# Patient Record
Sex: FEMALE | Race: WHITE | NOT HISPANIC OR LATINO | URBAN - METROPOLITAN AREA
[De-identification: names, ages, dates, MRNs, and addresses within clinical notes are randomized per-mention and may not be internally consistent; named-entity substitution may affect disease eponyms.]

---

## 2020-07-25 ENCOUNTER — TELEPHONE ENCOUNTER (OUTPATIENT)
Dept: URBAN - METROPOLITAN AREA CLINIC 13 | Facility: CLINIC | Age: 60
End: 2020-07-25

## 2020-07-25 RX ORDER — LISINOPRIL 2.5 MG/1
TAKE 1 TABLET DAILY TABLET ORAL
Refills: 0 | OUTPATIENT
End: 2017-05-02

## 2020-07-25 RX ORDER — OSPEMIFENE 60 MG/1
TAKE 1 TABLET DAILY TABLET, FILM COATED ORAL
Refills: 0 | OUTPATIENT
End: 2018-08-14

## 2020-07-25 RX ORDER — PANTOPRAZOLE SODIUM 20 MG
TAKE 1 TABLET DAILY TABLET, DELAYED RELEASE (ENTERIC COATED) ORAL
Refills: 0 | OUTPATIENT
Start: 2007-08-29 | End: 2008-01-23

## 2020-07-25 RX ORDER — OMEPRAZOLE 40 MG/1
TAKE (1) TABLET BY MOUTH DAILY CAPSULE, DELAYED RELEASE ORAL
Qty: 30 | Refills: 2 | OUTPATIENT
Start: 2016-01-19 | End: 2016-04-13

## 2020-07-25 RX ORDER — ALENDRONATE SODIUM 70 MG/1
TAKE 1 TABLET ONCE WEEKLY TABLET ORAL
Refills: 0 | OUTPATIENT
Start: 2012-07-26 | End: 2019-07-18

## 2020-07-25 RX ORDER — POLYETHYLENE GLYCOL 3350, SODIUM CHLORIDE, SODIUM BICARBONATE AND POTASSIUM CHLORIDE WITH LEMON FLAVOR 420; 11.2; 5.72; 1.48 G/4L; G/4L; G/4L; G/4L
TAKE AS DIRECTED POWDER, FOR SOLUTION ORAL
Qty: 1 | Refills: 0 | OUTPATIENT
Start: 2015-06-03 | End: 2016-04-13

## 2020-07-25 RX ORDER — MORPHINE SULFATE 15 MG
TAKE 1 TABLET EVERY 4 TO 6 HOURS AS NEEDED FOR PAIN TABLET ORAL
Refills: 0 | OUTPATIENT
End: 2017-05-02

## 2020-07-25 RX ORDER — METOCLOPRAMIDE 10 MG/1
TAKE 1 TABLET TWICE DAILY PRN NAUSEA/PAIN TABLET ORAL
Qty: 60 | Refills: 2 | OUTPATIENT
Start: 2016-07-21 | End: 2017-05-02

## 2020-07-25 RX ORDER — POLYETHYLENE GLYCOL 3350, SODIUM CHLORIDE, SODIUM BICARBONATE AND POTASSIUM CHLORIDE WITH LEMON FLAVOR 420; 11.2; 5.72; 1.48 G/4L; G/4L; G/4L; G/4L
TAKE 1/2 GALLON AT 5:00 PM DAY BEFORE PROCEDURE, TAKE SECOND 1/2 OF GALLON 6 HRS PRIOR TO PROCEDURE POWDER, FOR SOLUTION ORAL
Qty: 1 | Refills: 0 | OUTPATIENT
Start: 2019-09-26 | End: 2020-02-26

## 2020-07-25 RX ORDER — DIAZEPAM 5 MG/1
TAKE 1 TABLET EVERY 6 HOURS TAKE 1-2 TABLETS BY MOUTH PRE-MRI TABLET ORAL
Qty: 5 | Refills: 0 | OUTPATIENT
Start: 2018-07-16 | End: 2019-09-26

## 2020-07-25 RX ORDER — POLYETHYLENE GLYCOL 3350 17 G/DOSE
TAKE AS DIRECTED ON PATIENT INSTRUCTION CARD POWDER (GRAM) ORAL
Refills: 0 | OUTPATIENT
Start: 2007-07-23 | End: 2016-04-13

## 2020-07-25 RX ORDER — HYDROCODONE BITARTRATE AND ACETAMINOPHEN 7.5; 325 MG/1; MG/1
TAKE 1 TABLET EVERY 4 TO 6 HOURS AS NEEDED TABLET ORAL
Refills: 0 | OUTPATIENT
Start: 2007-07-23 | End: 2009-10-23

## 2020-07-25 RX ORDER — LINACLOTIDE 145 UG/1
TAKE ONE CAPSULE BY MOUTH EVERY MORNING BEFORE THE FIRST MEAL OF THE DAY CAPSULE, GELATIN COATED ORAL
Qty: 30 | Refills: 8 | OUTPATIENT
Start: 2015-06-02 | End: 2016-04-13

## 2020-07-25 RX ORDER — LINACLOTIDE 290 UG/1
TAKE ONE CAPSULE BY MOUTH DAILY 30 MINUTES BEFORE BREAKFAST CAPSULE, GELATIN COATED ORAL
Qty: 30 | Refills: 5 | OUTPATIENT
Start: 2016-04-13 | End: 2017-05-02

## 2020-07-25 RX ORDER — TRAZODONE HYDROCHLORIDE 50 MG/1
TABLET ORAL
Qty: 90 | Refills: 0 | OUTPATIENT
Start: 2019-05-07 | End: 2019-07-18

## 2020-07-25 RX ORDER — PANTOPRAZOLE SODIUM 40 MG/1
TAKE ONE TABLET BY MOUTH ONCE DAILY TABLET, DELAYED RELEASE ORAL
Qty: 30 | Refills: 10 | OUTPATIENT
Start: 2016-02-16 | End: 2017-05-02

## 2020-07-25 RX ORDER — AMITRIPTYLINE HYDROCHLORIDE 50 MG/1
TABLET, FILM COATED ORAL
Qty: 30 | Refills: 0 | OUTPATIENT
Start: 2019-06-27 | End: 2019-07-18

## 2020-07-25 RX ORDER — OXYCODONE AND ACETAMINOPHEN 5; 325 MG/1; MG/1
TABLET ORAL
Qty: 30 | Refills: 0 | OUTPATIENT
Start: 2011-07-08 | End: 2014-12-16

## 2020-07-25 RX ORDER — DIAZEPAM 5 MG/1
TAKE 1 TO 2 TABLETS BEFORE PROCEDURE TABLET ORAL
Qty: 2 | Refills: 0 | OUTPATIENT
Start: 2016-07-21 | End: 2017-05-02

## 2020-07-25 RX ORDER — AMITRIPTYLINE HYDROCHLORIDE 25 MG/1
TABLET, FILM COATED ORAL
Qty: 30 | Refills: 0 | OUTPATIENT
Start: 2019-05-30 | End: 2019-07-18

## 2020-07-25 RX ORDER — POLYETHYLENE GLYCOL 3350, SODIUM CHLORIDE, SODIUM BICARBONATE AND POTASSIUM CHLORIDE WITH LEMON FLAVOR 420; 11.2; 5.72; 1.48 G/4L; G/4L; G/4L; G/4L
USE AS DIRECTED POWDER, FOR SOLUTION ORAL
Qty: 1 | Refills: 0 | OUTPATIENT
Start: 2016-04-13 | End: 2016-07-21

## 2020-07-25 RX ORDER — METOCLOPRAMIDE 10 MG/1
TAKE ONE TABLET BY MOUTH TWO TIMES A DAY AS NEEDED FOR NAUSEA/PAIN TABLET ORAL
Qty: 60 | Refills: 5 | OUTPATIENT
Start: 2016-11-15 | End: 2019-09-26

## 2020-07-26 ENCOUNTER — TELEPHONE ENCOUNTER (OUTPATIENT)
Dept: URBAN - METROPOLITAN AREA CLINIC 13 | Facility: CLINIC | Age: 60
End: 2020-07-26

## 2020-07-26 RX ORDER — CEPHALEXIN 500 MG/1
CAPSULE ORAL
Qty: 7 | Refills: 0 | Status: ACTIVE | COMMUNITY
Start: 2011-10-28

## 2020-07-26 RX ORDER — MORPHINE SULFATE 30 MG/1
TAKE 1 TABLET TWICE DAILY TABLET, FILM COATED, EXTENDED RELEASE ORAL
Refills: 0 | Status: ACTIVE | COMMUNITY
Start: 2019-03-19

## 2020-07-26 RX ORDER — CYCLOBENZAPRINE HYDROCHLORIDE 10 MG/1
TABLET, FILM COATED ORAL
Qty: 90 | Refills: 0 | Status: ACTIVE | COMMUNITY
Start: 2019-09-18

## 2020-07-26 RX ORDER — NITROFURANTOIN MONOHYDRATE/MACROCRYSTALLINE 25; 75 MG/1; MG/1
CAPSULE ORAL
Qty: 14 | Refills: 0 | Status: ACTIVE | COMMUNITY
Start: 2011-08-01

## 2020-07-26 RX ORDER — FLUTICASONE PROPIONATE 50 UG/1
SPRAY, METERED NASAL
Qty: 16 | Refills: 0 | Status: ACTIVE | COMMUNITY
Start: 2013-04-16

## 2020-07-26 RX ORDER — PHENOBARBITAL, HYOSCYAMINE SULFATE, ATROPINE SULFATE, SCOPOLAMINE HYDROBROMIDE 16.2; .1037; .0194; .0065 MG/1; MG/1; MG/1; MG/1
TAKE 1 TABLET BY MOUTH EVERY 6 HOURS FOR PAIN TABLET ORAL
Qty: 60 | Refills: 3 | Status: ACTIVE | COMMUNITY
Start: 2020-07-09

## 2020-07-26 RX ORDER — HYDROCODONE BITARTRATE AND ACETAMINOPHEN 5; 325 MG/1; MG/1
TABLET ORAL
Qty: 26 | Refills: 0 | Status: ACTIVE | COMMUNITY
Start: 2018-10-18

## 2020-07-26 RX ORDER — HYDROXYZINE PAMOATE 25 MG/1
CAPSULE ORAL
Qty: 30 | Refills: 0 | Status: ACTIVE | COMMUNITY
Start: 2020-01-02

## 2020-07-26 RX ORDER — PREGABALIN 100 MG
CAPSULE ORAL
Qty: 90 | Refills: 0 | Status: ACTIVE | COMMUNITY
Start: 2011-11-17

## 2020-07-26 RX ORDER — OXYCODONE 15 MG/1
TAKE 1 TABLET EVERY 6 HOURS AS NEEDED FOR PAIN TABLET ORAL
Refills: 0 | Status: ACTIVE | COMMUNITY

## 2020-07-26 RX ORDER — CYCLOBENZAPRINE HYDROCHLORIDE 10 MG/1
TABLET, FILM COATED ORAL
Qty: 90 | Refills: 0 | Status: ACTIVE | COMMUNITY
Start: 2018-08-21

## 2020-07-26 RX ORDER — CYCLOBENZAPRINE HYDROCHLORIDE 10 MG/1
TABLET, FILM COATED ORAL
Qty: 90 | Refills: 0 | Status: ACTIVE | COMMUNITY
Start: 2019-06-20

## 2020-07-26 RX ORDER — SODIUM SULFATE, POTASSIUM SULFATE, MAGNESIUM SULFATE 17.5; 3.13; 1.6 G/ML; G/ML; G/ML
TAKE 1 BOTTLE AT 5:00PM THE DAY BEFORE PROCEDURE. TAKE 2ND BOTTLE 6 HRS PRIOR TO PROCEDURE SOLUTION, CONCENTRATE ORAL
Qty: 1 | Refills: 0 | Status: ACTIVE | COMMUNITY
Start: 2020-03-12

## 2020-07-26 RX ORDER — PRAVASTATIN SODIUM 20 MG/1
TAKE 1 TABLET DAILY AS DIRECTED TABLET ORAL
Refills: 0 | Status: ACTIVE | COMMUNITY
Start: 2019-07-02

## 2020-07-26 RX ORDER — ONDANSETRON HYDROCHLORIDE 4 MG/1
TAKE 1 TABLET BY MOUTH EVERY 4 TO 6 HOURS AS NEEDED FOR NAUSEA TABLET, FILM COATED ORAL
Qty: 30 | Refills: 1 | Status: ACTIVE | COMMUNITY
Start: 2019-09-26

## 2020-07-26 RX ORDER — PHENAZOPYRIDINE HYDROCHLORIDE 100 MG/1
TAKE 1 CAPSULE TWICE DAILY TABLET, COATED ORAL
Refills: 0 | Status: ACTIVE | COMMUNITY

## 2020-07-26 RX ORDER — NITROFURANTOIN MONOHYDRATE/MACROCRYSTALLINE 25; 75 MG/1; MG/1
CAPSULE ORAL
Qty: 10 | Refills: 0 | Status: ACTIVE | COMMUNITY
Start: 2019-05-31

## 2020-07-26 RX ORDER — AMITRIPTYLINE HYDROCHLORIDE 10 MG/1
TAKE 1 TABLET BY MOUTH AT BEDTIME TABLET, FILM COATED ORAL
Qty: 30 | Refills: 5 | Status: ACTIVE | COMMUNITY
Start: 2019-09-26

## 2020-07-26 RX ORDER — PROMETHAZINE HYDROCHLORIDE 12.5 MG/1
TABLET ORAL
Qty: 30 | Refills: 0 | Status: ACTIVE | COMMUNITY
Start: 2018-10-08

## 2020-07-26 RX ORDER — MUPIROCIN 20 MG/G
OINTMENT TOPICAL
Qty: 22 | Refills: 0 | Status: ACTIVE | COMMUNITY
Start: 2020-01-14

## 2020-07-26 RX ORDER — PREGABALIN 100 MG
TAKE 1 CAPSULE 3 TIMES DAILY CAPSULE ORAL
Refills: 0 | Status: ACTIVE | COMMUNITY
Start: 2018-06-21

## 2020-07-26 RX ORDER — PREGABALIN 25 MG/1
CAPSULE ORAL
Qty: 90 | Refills: 0 | Status: ACTIVE | COMMUNITY
Start: 2011-10-24

## 2020-07-26 RX ORDER — CYCLOBENZAPRINE HYDROCHLORIDE 10 MG/1
TABLET, FILM COATED ORAL
Qty: 90 | Refills: 0 | Status: ACTIVE | COMMUNITY
Start: 2020-03-10

## 2020-07-26 RX ORDER — PHENOBARBITAL, HYOSCYAMINE SULFATE, ATROPINE SULFATE, SCOPOLAMINE HYDROBROMIDE 16.2; .1037; .0194; .0065 MG/1; MG/1; MG/1; MG/1
TAKE 1 TABLET EVERY 6 HOURS PRN TAKE 1 TABLET EVERY 6 HOURS AS NEEDED FOR PAIN TABLET ORAL
Qty: 60 | Refills: 3 | Status: ACTIVE | COMMUNITY
Start: 2019-07-02

## 2020-07-26 RX ORDER — VALACYCLOVIR HYDROCHLORIDE 500 MG/1
TABLET, FILM COATED ORAL
Qty: 14 | Refills: 0 | Status: ACTIVE | COMMUNITY
Start: 2020-03-05

## 2020-07-26 RX ORDER — METHENAMINE, SODIUM PHOSPHATE, MONOBASIC, METHYLENE BLUE, AND HYOSCYAMINE SULFATE 81.6; 40.8; 10.8; .12 MG/1; MG/1; MG/1; MG/1
TABLET, COATED ORAL
Qty: 30 | Refills: 0 | Status: ACTIVE | COMMUNITY
Start: 2012-04-11

## 2020-07-26 RX ORDER — CIPROFLOXACIN 3 MG/ML
SOLUTION OPHTHALMIC
Qty: 5 | Refills: 0 | Status: ACTIVE | COMMUNITY
Start: 2018-08-07

## 2020-07-26 RX ORDER — CEPHALEXIN 500 MG/1
CAPSULE ORAL
Qty: 7 | Refills: 0 | Status: ACTIVE | COMMUNITY
Start: 2020-01-14

## 2020-07-26 RX ORDER — CYCLOBENZAPRINE HYDROCHLORIDE 10 MG/1
TABLET, FILM COATED ORAL
Qty: 90 | Refills: 0 | Status: ACTIVE | COMMUNITY
Start: 2018-07-19

## 2020-07-26 RX ORDER — OXYCODONE AND ACETAMINOPHEN 5; 325 MG/1; MG/1
TABLET ORAL
Qty: 30 | Refills: 0 | Status: ACTIVE | COMMUNITY
Start: 2011-07-08

## 2020-07-26 RX ORDER — ONDANSETRON 4 MG/1
TABLET, ORALLY DISINTEGRATING ORAL
Qty: 15 | Refills: 0 | Status: ACTIVE | COMMUNITY
Start: 2019-05-31

## 2020-07-26 RX ORDER — MUPIROCIN 20 MG/G
OINTMENT TOPICAL
Qty: 22 | Refills: 0 | Status: ACTIVE | COMMUNITY
Start: 2019-06-27

## 2020-07-26 RX ORDER — HYDROCODONE BITARTRATE AND HOMATROPINE METHYLBROMIDE 5; 1.5 MG/5ML; MG/5ML
SYRUP ORAL
Qty: 120 | Refills: 0 | Status: ACTIVE | COMMUNITY
Start: 2013-04-16

## 2020-07-26 RX ORDER — HYDROMORPHONE HYDROCHLORIDE 2 MG/1
TABLET ORAL
Qty: 40 | Refills: 0 | Status: ACTIVE | COMMUNITY
Start: 2018-10-08

## 2020-07-26 RX ORDER — CYCLOBENZAPRINE HYDROCHLORIDE 10 MG/1
TABLET, FILM COATED ORAL
Qty: 90 | Refills: 0 | Status: ACTIVE | COMMUNITY
Start: 2019-02-15

## 2020-07-26 RX ORDER — PREGABALIN 100 MG
CAPSULE ORAL
Qty: 90 | Refills: 0 | Status: ACTIVE | COMMUNITY
Start: 2013-02-01

## 2020-07-26 RX ORDER — CLONIDINE HYDROCHLORIDE 0.1 MG/1
TAKE 1 TABLET TWICE DAILY TABLET ORAL
Refills: 0 | Status: ACTIVE | COMMUNITY
Start: 2019-07-03

## 2020-07-26 RX ORDER — CYCLOBENZAPRINE HYDROCHLORIDE 10 MG/1
TABLET, FILM COATED ORAL
Qty: 90 | Refills: 0 | Status: ACTIVE | COMMUNITY
Start: 2018-10-22

## 2020-07-26 RX ORDER — PANTOPRAZOLE SODIUM 40 MG/1
TAKE ONE TABLET BY MOUTH TWO TIMES A DAY TABLET, DELAYED RELEASE ORAL
Qty: 180 | Refills: 3 | Status: ACTIVE | COMMUNITY
Start: 2016-08-01

## 2020-07-26 RX ORDER — AMOXICILLIN AND CLAVULANATE POTASSIUM 875; 125 MG/1; MG/1
TABLET, FILM COATED ORAL
Qty: 20 | Refills: 0 | Status: ACTIVE | COMMUNITY
Start: 2013-04-16

## 2020-11-02 ENCOUNTER — ERX REFILL RESPONSE (OUTPATIENT)
Age: 60
End: 2020-11-02

## 2020-11-02 RX ORDER — PANTOPRAZOLE SODIUM 40 MG/1
TAKE ONE TABLET BY MOUTH TWO TIMES A DAY TABLET, DELAYED RELEASE ORAL
Qty: 180 | Refills: 2

## 2020-12-22 ENCOUNTER — ERX REFILL RESPONSE (OUTPATIENT)
Age: 60
End: 2020-12-22

## 2020-12-22 RX ORDER — ONDANSETRON HYDROCHLORIDE 4 MG/1
TAKE 1 TABLET BY MOUTH EVERY 4 TO 6 HOURS AS NEEDED FOR NAUSEA TABLET, FILM COATED ORAL
Qty: 30 | Refills: 0

## 2021-01-20 ENCOUNTER — TELEPHONE ENCOUNTER (OUTPATIENT)
Dept: URBAN - METROPOLITAN AREA CLINIC 113 | Facility: CLINIC | Age: 61
End: 2021-01-20

## 2021-01-20 RX ORDER — PHENOBARBITAL, HYOSCYAMINE SULFATE, ATROPINE SULFATE, SCOPOLAMINE HYDROBROMIDE 16.2; .1037; .0194; .0065 MG/1; MG/1; MG/1; MG/1
1 TABLET TABLET ORAL THREE TIMES A DAY
Qty: 90 TABLET | Refills: 3 | OUTPATIENT
Start: 2021-01-20 | End: 2021-05-20

## 2021-02-22 ENCOUNTER — TELEPHONE ENCOUNTER (OUTPATIENT)
Dept: URBAN - METROPOLITAN AREA CLINIC 113 | Facility: CLINIC | Age: 61
End: 2021-02-22

## 2021-02-22 ENCOUNTER — LAB OUTSIDE AN ENCOUNTER (OUTPATIENT)
Dept: URBAN - METROPOLITAN AREA CLINIC 113 | Facility: CLINIC | Age: 61
End: 2021-02-22

## 2021-02-22 ENCOUNTER — OFFICE VISIT (OUTPATIENT)
Dept: URBAN - METROPOLITAN AREA CLINIC 113 | Facility: CLINIC | Age: 61
End: 2021-02-22
Payer: COMMERCIAL

## 2021-02-22 VITALS
HEART RATE: 100 BPM | TEMPERATURE: 97.9 F | DIASTOLIC BLOOD PRESSURE: 83 MMHG | WEIGHT: 153 LBS | RESPIRATION RATE: 19 BRPM | HEIGHT: 63 IN | BODY MASS INDEX: 27.11 KG/M2 | SYSTOLIC BLOOD PRESSURE: 126 MMHG

## 2021-02-22 DIAGNOSIS — K21.9 GASTROESOPHAGEAL REFLUX DISEASE WITHOUT ESOPHAGITIS: ICD-10-CM

## 2021-02-22 DIAGNOSIS — K59.03 DRUG-INDUCED CONSTIPATION: ICD-10-CM

## 2021-02-22 DIAGNOSIS — D49.0 IPMN (INTRADUCTAL PAPILLARY MUCINOUS NEOPLASM): ICD-10-CM

## 2021-02-22 DIAGNOSIS — R11.0 NAUSEA: ICD-10-CM

## 2021-02-22 DIAGNOSIS — K31.84 GASTROPARESIS: ICD-10-CM

## 2021-02-22 DIAGNOSIS — Z12.11 COLON CANCER SCREENING: ICD-10-CM

## 2021-02-22 PROCEDURE — 99214 OFFICE O/P EST MOD 30 MIN: CPT | Performed by: INTERNAL MEDICINE

## 2021-02-22 RX ORDER — AMOXICILLIN AND CLAVULANATE POTASSIUM 875; 125 MG/1; MG/1
TABLET, FILM COATED ORAL
Qty: 20 | Refills: 0 | Status: DISCONTINUED | COMMUNITY
Start: 2013-04-16

## 2021-02-22 RX ORDER — VALACYCLOVIR HYDROCHLORIDE 500 MG/1
1 TABLET TABLET, FILM COATED ORAL ONCE A DAY
Refills: 0 | Status: ACTIVE | COMMUNITY
Start: 2020-03-05

## 2021-02-22 RX ORDER — ONDANSETRON 4 MG/1
1 TABLET ON THE TONGUE AND ALLOW TO DISSOLVE  AS NEEDED TABLET, ORALLY DISINTEGRATING ORAL AS NEEDED
Refills: 0 | Status: DISCONTINUED | COMMUNITY
Start: 2019-05-31

## 2021-02-22 RX ORDER — FENOFIBRATE 145 MG/1
1 TABLET TABLET, FILM COATED ORAL ONCE A DAY
Status: ACTIVE | COMMUNITY

## 2021-02-22 RX ORDER — PHENAZOPYRIDINE HYDROCHLORIDE 100 MG/1
TAKE 1 CAPSULE TWICE DAILY TABLET, COATED ORAL
Refills: 0 | Status: ACTIVE | COMMUNITY

## 2021-02-22 RX ORDER — CLONIDINE HYDROCHLORIDE 0.1 MG/1
TAKE 1 TABLET TWICE DAILY TABLET ORAL
Refills: 0 | Status: ACTIVE | COMMUNITY
Start: 2019-07-03

## 2021-02-22 RX ORDER — METHENAMINE, SODIUM PHOSPHATE, MONOBASIC, METHYLENE BLUE, AND HYOSCYAMINE SULFATE 81.6; 40.8; 10.8; .12 MG/1; MG/1; MG/1; MG/1
TABLET, COATED ORAL
Qty: 30 | Refills: 0 | Status: DISCONTINUED | COMMUNITY
Start: 2012-04-11

## 2021-02-22 RX ORDER — PHENOBARBITAL, HYOSCYAMINE SULFATE, ATROPINE SULFATE, SCOPOLAMINE HYDROBROMIDE 16.2; .1037; .0194; .0065 MG/1; MG/1; MG/1; MG/1
TAKE 1 TABLET BY MOUTH EVERY 6 HOURS FOR PAIN TABLET ORAL
Qty: 60 | Refills: 3 | Status: DISCONTINUED | COMMUNITY
Start: 2020-07-09

## 2021-02-22 RX ORDER — PHENOBARBITAL, HYOSCYAMINE SULFATE, ATROPINE SULFATE, SCOPOLAMINE HYDROBROMIDE 16.2; .1037; .0194; .0065 MG/1; MG/1; MG/1; MG/1
TAKE 1 TABLET EVERY 6 HOURS PRN TAKE 1 TABLET EVERY 6 HOURS AS NEEDED FOR PAIN TABLET ORAL
Qty: 60 | Refills: 3 | Status: DISCONTINUED | COMMUNITY
Start: 2019-07-02

## 2021-02-22 RX ORDER — HYDROMORPHONE HYDROCHLORIDE 2 MG/1
TABLET ORAL
Qty: 40 | Refills: 0 | Status: DISCONTINUED | COMMUNITY
Start: 2018-10-08

## 2021-02-22 RX ORDER — PANTOPRAZOLE SODIUM 40 MG/1
TAKE ONE TABLET BY MOUTH TWO TIMES A DAY TABLET, DELAYED RELEASE ORAL
Qty: 180 | Refills: 2 | Status: ACTIVE | COMMUNITY

## 2021-02-22 RX ORDER — MUPIROCIN 20 MG/G
OINTMENT TOPICAL
Qty: 22 | Refills: 0 | Status: DISCONTINUED | COMMUNITY
Start: 2019-06-27

## 2021-02-22 RX ORDER — FLUTICASONE PROPIONATE 50 UG/1
SPRAY, METERED NASAL
Qty: 16 | Refills: 0 | Status: DISCONTINUED | COMMUNITY
Start: 2013-04-16

## 2021-02-22 RX ORDER — PHENOBARBITAL, HYOSCYAMINE SULFATE, ATROPINE SULFATE, SCOPOLAMINE HYDROBROMIDE 16.2; .1037; .0194; .0065 MG/1; MG/1; MG/1; MG/1
1 TABLET TABLET ORAL THREE TIMES A DAY
Qty: 90 TABLET | Refills: 3 | Status: ACTIVE | COMMUNITY
Start: 2021-01-20 | End: 2021-05-20

## 2021-02-22 RX ORDER — HYDROCODONE BITARTRATE AND HOMATROPINE METHYLBROMIDE 5; 1.5 MG/5ML; MG/5ML
SYRUP ORAL
Qty: 120 | Refills: 0 | Status: DISCONTINUED | COMMUNITY
Start: 2013-04-16

## 2021-02-22 RX ORDER — PRAVASTATIN SODIUM 20 MG/1
TAKE 1 TABLET DAILY AS DIRECTED TABLET ORAL
Refills: 0 | Status: ACTIVE | COMMUNITY
Start: 2019-07-02

## 2021-02-22 RX ORDER — OXYCODONE AND ACETAMINOPHEN 5; 325 MG/1; MG/1
TABLET ORAL
Qty: 30 | Refills: 0 | Status: DISCONTINUED | COMMUNITY
Start: 2011-07-08

## 2021-02-22 RX ORDER — PROMETHAZINE HYDROCHLORIDE 12.5 MG/1
TABLET ORAL
Qty: 30 | Refills: 0 | Status: DISCONTINUED | COMMUNITY
Start: 2018-10-08

## 2021-02-22 RX ORDER — ONDANSETRON HYDROCHLORIDE 4 MG/1
TAKE 1 TABLET BY MOUTH EVERY 4 TO 6 HOURS AS NEEDED FOR NAUSEA TABLET, FILM COATED ORAL
Qty: 30 | Refills: 0 | Status: ACTIVE | COMMUNITY

## 2021-02-22 RX ORDER — HYDROCODONE BITARTRATE AND ACETAMINOPHEN 5; 325 MG/1; MG/1
TABLET ORAL
Qty: 26 | Refills: 0 | Status: DISCONTINUED | COMMUNITY
Start: 2018-10-18

## 2021-02-22 RX ORDER — AMITRIPTYLINE HYDROCHLORIDE 10 MG/1
TAKE 1 TABLET BY MOUTH AT BEDTIME TABLET, FILM COATED ORAL
Qty: 30 | Refills: 5 | Status: DISCONTINUED | COMMUNITY
Start: 2019-09-26

## 2021-02-22 RX ORDER — LANSOPRAZOLE 30 MG/1
1 TABLET TABLET, ORALLY DISINTEGRATING, DELAYED RELEASE ORAL ONCE A DAY
Qty: 30 | Refills: 3 | Status: ACTIVE | COMMUNITY

## 2021-02-22 RX ORDER — MORPHINE SULFATE 30 MG/1
TAKE 1 TABLET TWICE DAILY TABLET, FILM COATED, EXTENDED RELEASE ORAL
Refills: 0 | Status: ACTIVE | COMMUNITY
Start: 2019-03-19

## 2021-02-22 RX ORDER — LANSOPRAZOLE 30 MG/1
1 TABLET TABLET, ORALLY DISINTEGRATING, DELAYED RELEASE ORAL ONCE A DAY
Qty: 30 | Refills: 3 | OUTPATIENT

## 2021-02-22 RX ORDER — OXYCODONE 15 MG/1
TAKE 1 TABLET EVERY 6 HOURS AS NEEDED FOR PAIN TABLET ORAL
Refills: 0 | Status: ACTIVE | COMMUNITY

## 2021-02-22 RX ORDER — SODIUM SULFATE, POTASSIUM SULFATE, MAGNESIUM SULFATE 17.5; 3.13; 1.6 G/ML; G/ML; G/ML
TAKE 1 BOTTLE AT 5:00PM THE DAY BEFORE PROCEDURE. TAKE 2ND BOTTLE 6 HRS PRIOR TO PROCEDURE SOLUTION, CONCENTRATE ORAL
Qty: 1 | Refills: 0 | Status: DISCONTINUED | COMMUNITY
Start: 2020-03-12

## 2021-02-22 RX ORDER — HYDROXYZINE PAMOATE 25 MG/1
CAPSULE ORAL
Qty: 30 | Refills: 0 | Status: DISCONTINUED | COMMUNITY
Start: 2020-01-02

## 2021-02-22 RX ORDER — CYCLOBENZAPRINE HYDROCHLORIDE 10 MG/1
TABLET, FILM COATED ORAL
Qty: 90 | Refills: 0 | Status: DISCONTINUED | COMMUNITY
Start: 2018-07-19

## 2021-02-22 RX ORDER — PREGABALIN 25 MG/1
CAPSULE ORAL
Qty: 90 | Refills: 0 | Status: DISCONTINUED | COMMUNITY
Start: 2011-10-24

## 2021-02-22 RX ORDER — CIPROFLOXACIN 3 MG/ML
SOLUTION OPHTHALMIC
Qty: 5 | Refills: 0 | Status: DISCONTINUED | COMMUNITY
Start: 2018-08-07

## 2021-02-22 RX ORDER — NITROFURANTOIN MONOHYDRATE/MACROCRYSTALLINE 25; 75 MG/1; MG/1
CAPSULE ORAL
Qty: 14 | Refills: 0 | Status: DISCONTINUED | COMMUNITY
Start: 2011-08-01

## 2021-02-22 RX ORDER — SODIUM PICOSULFATE, MAGNESIUM OXIDE, AND ANHYDROUS CITRIC ACID 10; 3.5; 12 MG/160ML; G/160ML; G/160ML
160ML LIQUID ORAL ONCE
Qty: 1 | Refills: 0 | OUTPATIENT
Start: 2021-02-22 | End: 2021-02-23

## 2021-02-22 RX ORDER — CEPHALEXIN 500 MG/1
CAPSULE ORAL
Qty: 7 | Refills: 0 | Status: DISCONTINUED | COMMUNITY
Start: 2011-10-28

## 2021-02-22 RX ORDER — PREGABALIN 100 MG
CAPSULE ORAL
Qty: 90 | Refills: 0 | Status: DISCONTINUED | COMMUNITY
Start: 2011-11-17

## 2021-02-22 NOTE — HPI-TODAY'S VISIT:
The patient is a very delightful 60-year-old female who I followed for common bile duct stones, IPMN and narcotic bowel syndrome.  She was last seen in September 2019.  Her narcotic bowel syndrome was complicated by constipation gastroesophageal reflux disease and gastroparesis.  Her last upper endoscopy was in 2006.  This revealed gastric stasis.  Her last ERCP was in 2016.  There were no stones or sludge seen despite MRI showing stones and sludge.  There were no gastric abnormalities seen.  Last colonoscopy was in February 2020.  This was for screening purposes.  Quality of the prep was poor.  Patient had diverticulosis present.  She was scheduled for repeat colonoscopy with a 2-day prep.  Last imaging studies are CT abdomen and pelvis with contrast in August 2019 which showed hepatic steatosis and an ovarian cyst.  Last MRI was July 2018.  This revealed a 1.3 cm IPMN within the head of the pancreas.she's  She states in the last year she had surgery on her low back but this did not help.  She had a very bad experience with the surgery.  She was unable to come back for repeat colonoscopy due to the surgery and covid issues.she's having more nausea and heartburn.  She is on pantoprazole which is known poor treatment in patients with gastroparesis.  She states her constipation is under good control.

## 2021-02-25 ENCOUNTER — ERX REFILL RESPONSE (OUTPATIENT)
Dept: URBAN - METROPOLITAN AREA CLINIC 113 | Facility: CLINIC | Age: 61
End: 2021-02-25

## 2021-02-25 ENCOUNTER — TELEPHONE ENCOUNTER (OUTPATIENT)
Dept: URBAN - METROPOLITAN AREA CLINIC 113 | Facility: CLINIC | Age: 61
End: 2021-02-25

## 2021-02-25 RX ORDER — POLYETHYLENE GLYCOL 3350, SODIUM CHLORIDE, SODIUM BICARBONATE AND POTASSIUM CHLORIDE 420G
AS DIRECTED KIT ORAL ONCE
Qty: 420 GRAM | Refills: 0 | OUTPATIENT
Start: 2021-02-26 | End: 2021-02-27

## 2021-02-25 RX ORDER — POLYETHYLENE GLYCOL 3350, SODIUM SULFATE, SODIUM CHLORIDE, POTASSIUM CHLORIDE, ASCORBIC ACID, SODIUM ASCORBATE 140-9-5.2G
AS DIRECTED KIT ORAL
OUTPATIENT
Start: 2021-02-26

## 2021-02-25 RX ORDER — ONDANSETRON HYDROCHLORIDE 4 MG/1
TAKE 1 TABLET BY MOUTH EVERY 4 TO 6 HOURS AS NEEDED FOR NAUSEA TABLET, FILM COATED ORAL
Qty: 30 | Refills: 0

## 2021-02-25 RX ORDER — POLYETHYLENE GLYCOL 3350, SODIUM SULFATE, SODIUM CHLORIDE, POTASSIUM CHLORIDE, ASCORBIC ACID, SODIUM ASCORBATE 140-9-5.2G
AS DIRECTED KIT ORAL ONCE
Qty: 140 GRAMS | Refills: 0 | OUTPATIENT
Start: 2021-02-26 | End: 2021-02-27

## 2021-02-26 ENCOUNTER — TELEPHONE ENCOUNTER (OUTPATIENT)
Dept: URBAN - METROPOLITAN AREA CLINIC 113 | Facility: CLINIC | Age: 61
End: 2021-02-26

## 2021-02-26 RX ORDER — ONDANSETRON HYDROCHLORIDE 4 MG/1
1 TABLET TABLET, FILM COATED ORAL
Qty: 30 TABLETS | Refills: 0 | OUTPATIENT

## 2021-03-09 ENCOUNTER — TELEPHONE ENCOUNTER (OUTPATIENT)
Dept: URBAN - METROPOLITAN AREA SURGERY CENTER 30 | Facility: SURGERY CENTER | Age: 61
End: 2021-03-09

## 2021-03-24 ENCOUNTER — TELEPHONE ENCOUNTER (OUTPATIENT)
Dept: URBAN - METROPOLITAN AREA CLINIC 113 | Facility: CLINIC | Age: 61
End: 2021-03-24

## 2021-03-29 ENCOUNTER — OFFICE VISIT (OUTPATIENT)
Dept: URBAN - METROPOLITAN AREA SURGERY CENTER 25 | Facility: SURGERY CENTER | Age: 61
End: 2021-03-29
Payer: COMMERCIAL

## 2021-03-29 ENCOUNTER — CLAIMS CREATED FROM THE CLAIM WINDOW (OUTPATIENT)
Dept: URBAN - METROPOLITAN AREA CLINIC 4 | Facility: CLINIC | Age: 61
End: 2021-03-29
Payer: COMMERCIAL

## 2021-03-29 DIAGNOSIS — Z12.11 COLON CANCER SCREENING: ICD-10-CM

## 2021-03-29 DIAGNOSIS — D12.0 BENIGN NEOPLASM OF CECUM: ICD-10-CM

## 2021-03-29 DIAGNOSIS — C7A.010 MALIGNANT CARCINOID TUMOR OF THE DUODENUM: ICD-10-CM

## 2021-03-29 DIAGNOSIS — D3A.012 BENIGN CARCINOID TUMOR OF ILEUM: ICD-10-CM

## 2021-03-29 DIAGNOSIS — D12.0 ADENOMA OF CECUM: ICD-10-CM

## 2021-03-29 PROCEDURE — 88305 TISSUE EXAM BY PATHOLOGIST: CPT | Performed by: PATHOLOGY

## 2021-03-29 PROCEDURE — G8907 PT DOC NO EVENTS ON DISCHARG: HCPCS | Performed by: INTERNAL MEDICINE

## 2021-03-29 PROCEDURE — 88341 IMHCHEM/IMCYTCHM EA ADD ANTB: CPT | Performed by: PATHOLOGY

## 2021-03-29 PROCEDURE — 88342 IMHCHEM/IMCYTCHM 1ST ANTB: CPT | Performed by: PATHOLOGY

## 2021-03-29 PROCEDURE — 45385 COLONOSCOPY W/LESION REMOVAL: CPT | Performed by: INTERNAL MEDICINE

## 2021-03-29 RX ORDER — POLYETHYLENE GLYCOL 3350, SODIUM SULFATE, SODIUM CHLORIDE, POTASSIUM CHLORIDE, ASCORBIC ACID, SODIUM ASCORBATE 140-9-5.2G
AS DIRECTED KIT ORAL
Status: ACTIVE | COMMUNITY
Start: 2021-02-26

## 2021-03-29 RX ORDER — CLONIDINE HYDROCHLORIDE 0.1 MG/1
TAKE 1 TABLET TWICE DAILY TABLET ORAL
Refills: 0 | Status: ACTIVE | COMMUNITY
Start: 2019-07-03

## 2021-03-29 RX ORDER — PRAVASTATIN SODIUM 20 MG/1
TAKE 1 TABLET DAILY AS DIRECTED TABLET ORAL
Refills: 0 | Status: ACTIVE | COMMUNITY
Start: 2019-07-02

## 2021-03-29 RX ORDER — MORPHINE SULFATE 30 MG/1
TAKE 1 TABLET TWICE DAILY TABLET, FILM COATED, EXTENDED RELEASE ORAL
Refills: 0 | Status: ACTIVE | COMMUNITY
Start: 2019-03-19

## 2021-03-29 RX ORDER — FENOFIBRATE 145 MG/1
1 TABLET TABLET, FILM COATED ORAL ONCE A DAY
Status: ACTIVE | COMMUNITY

## 2021-03-29 RX ORDER — ONDANSETRON HYDROCHLORIDE 4 MG/1
1 TABLET TABLET, FILM COATED ORAL
Qty: 30 TABLETS | Refills: 0 | Status: ACTIVE | COMMUNITY

## 2021-03-29 RX ORDER — PHENOBARBITAL, HYOSCYAMINE SULFATE, ATROPINE SULFATE, SCOPOLAMINE HYDROBROMIDE 16.2; .1037; .0194; .0065 MG/1; MG/1; MG/1; MG/1
1 TABLET TABLET ORAL THREE TIMES A DAY
Qty: 90 TABLET | Refills: 3 | Status: ACTIVE | COMMUNITY
Start: 2021-01-20 | End: 2021-05-20

## 2021-03-29 RX ORDER — PHENAZOPYRIDINE HYDROCHLORIDE 100 MG/1
TAKE 1 CAPSULE TWICE DAILY TABLET, COATED ORAL
Refills: 0 | Status: ACTIVE | COMMUNITY

## 2021-03-29 RX ORDER — VALACYCLOVIR HYDROCHLORIDE 500 MG/1
1 TABLET TABLET, FILM COATED ORAL ONCE A DAY
Refills: 0 | Status: ACTIVE | COMMUNITY
Start: 2020-03-05

## 2021-03-29 RX ORDER — OXYCODONE 15 MG/1
TAKE 1 TABLET EVERY 6 HOURS AS NEEDED FOR PAIN TABLET ORAL
Refills: 0 | Status: ACTIVE | COMMUNITY

## 2021-03-29 RX ORDER — LANSOPRAZOLE 30 MG/1
1 TABLET TABLET, ORALLY DISINTEGRATING, DELAYED RELEASE ORAL ONCE A DAY
Qty: 30 | Refills: 3 | Status: ACTIVE | COMMUNITY

## 2021-03-29 RX ORDER — ONDANSETRON HYDROCHLORIDE 4 MG/1
TAKE 1 TABLET BY MOUTH EVERY 4 TO 6 HOURS AS NEEDED FOR NAUSEA TABLET, FILM COATED ORAL
Qty: 30 | Refills: 0 | Status: ACTIVE | COMMUNITY

## 2021-03-29 RX ORDER — PANTOPRAZOLE SODIUM 40 MG/1
TAKE ONE TABLET BY MOUTH TWO TIMES A DAY TABLET, DELAYED RELEASE ORAL
Qty: 180 | Refills: 2 | Status: ACTIVE | COMMUNITY

## 2021-04-12 ENCOUNTER — OFFICE VISIT (OUTPATIENT)
Dept: URBAN - METROPOLITAN AREA CLINIC 107 | Facility: CLINIC | Age: 61
End: 2021-04-12

## 2021-04-12 ENCOUNTER — TELEPHONE ENCOUNTER (OUTPATIENT)
Dept: URBAN - METROPOLITAN AREA CLINIC 113 | Facility: CLINIC | Age: 61
End: 2021-04-12

## 2021-04-12 LAB
A/G RATIO: 2.4
ALBUMIN: 4.8
ALKALINE PHOSPHATASE: 85
ALT (SGPT): 13
AMYLASE: 50
AST (SGOT): 21
BASO (ABSOLUTE): 0
BASOS: 1
BILIRUBIN, TOTAL: <0.2
BUN/CREATININE RATIO: 17
BUN: 10
C-REACTIVE PROTEIN, QUANT: 3
CALCIUM: 9.7
CARBON DIOXIDE, TOTAL: 29
CHLORIDE: 100
CREATININE: 0.6
EGFR IF AFRICN AM: 116
EGFR IF NONAFRICN AM: 101
EOS (ABSOLUTE): 0.1
EOS: 1
GLOBULIN, TOTAL: 2
GLUCOSE: 99
HEMATOCRIT: 38.1
HEMATOLOGY COMMENTS:: (no result)
HEMOGLOBIN: 13.3
IMMATURE CELLS: (no result)
IMMATURE GRANS (ABS): 0
IMMATURE GRANULOCYTES: 0
LYMPHS (ABSOLUTE): 1.7
LYMPHS: 28
MCH: 33.4
MCHC: 34.9
MCV: 96
MONOCYTES(ABSOLUTE): 0.4
MONOCYTES: 7
NEUTROPHILS (ABSOLUTE): 3.8
NEUTROPHILS: 63
NRBC: (no result)
PLATELETS: 227
POTASSIUM: 4.7
PROTEIN, TOTAL: 6.8
RBC: 3.98
RDW: 12.2
SODIUM: 140
WBC: 6.1

## 2021-04-12 RX ORDER — ONDANSETRON HYDROCHLORIDE 4 MG/1
TAKE 1 TABLET BY MOUTH EVERY 4 TO 6 HOURS AS NEEDED FOR NAUSEA TABLET, FILM COATED ORAL
Qty: 30 | Refills: 0 | Status: ACTIVE | COMMUNITY

## 2021-04-12 RX ORDER — POLYETHYLENE GLYCOL 3350, SODIUM SULFATE, SODIUM CHLORIDE, POTASSIUM CHLORIDE, ASCORBIC ACID, SODIUM ASCORBATE 140-9-5.2G
AS DIRECTED KIT ORAL
Status: ACTIVE | COMMUNITY
Start: 2021-02-26

## 2021-04-12 RX ORDER — FENOFIBRATE 145 MG/1
1 TABLET TABLET, FILM COATED ORAL ONCE A DAY
Status: ACTIVE | COMMUNITY

## 2021-04-12 RX ORDER — OXYCODONE 15 MG/1
TAKE 1 TABLET EVERY 6 HOURS AS NEEDED FOR PAIN TABLET ORAL
Refills: 0 | Status: ACTIVE | COMMUNITY

## 2021-04-12 RX ORDER — ONDANSETRON HYDROCHLORIDE 4 MG/1
1 TABLET TABLET, FILM COATED ORAL
Qty: 30 TABLETS | Refills: 0 | Status: ACTIVE | COMMUNITY

## 2021-04-12 RX ORDER — PANTOPRAZOLE SODIUM 40 MG/1
TAKE ONE TABLET BY MOUTH TWO TIMES A DAY TABLET, DELAYED RELEASE ORAL
Qty: 180 | Refills: 2 | Status: ACTIVE | COMMUNITY

## 2021-04-12 RX ORDER — PHENAZOPYRIDINE HYDROCHLORIDE 100 MG/1
TAKE 1 CAPSULE TWICE DAILY TABLET, COATED ORAL
Refills: 0 | Status: ACTIVE | COMMUNITY

## 2021-04-12 RX ORDER — PRAVASTATIN SODIUM 20 MG/1
TAKE 1 TABLET DAILY AS DIRECTED TABLET ORAL
Refills: 0 | Status: ACTIVE | COMMUNITY
Start: 2019-07-02

## 2021-04-12 RX ORDER — PHENOBARBITAL, HYOSCYAMINE SULFATE, ATROPINE SULFATE, SCOPOLAMINE HYDROBROMIDE 16.2; .1037; .0194; .0065 MG/1; MG/1; MG/1; MG/1
1 TABLET TABLET ORAL THREE TIMES A DAY
Qty: 90 TABLET | Refills: 3 | Status: ACTIVE | COMMUNITY
Start: 2021-01-20 | End: 2021-05-20

## 2021-04-12 RX ORDER — LANSOPRAZOLE 30 MG/1
1 TABLET TABLET, ORALLY DISINTEGRATING, DELAYED RELEASE ORAL ONCE A DAY
Qty: 30 | Refills: 3 | Status: ACTIVE | COMMUNITY

## 2021-04-12 RX ORDER — VALACYCLOVIR HYDROCHLORIDE 500 MG/1
1 TABLET TABLET, FILM COATED ORAL ONCE A DAY
Refills: 0 | Status: ACTIVE | COMMUNITY
Start: 2020-03-05

## 2021-04-12 RX ORDER — MORPHINE SULFATE 30 MG/1
TAKE 1 TABLET TWICE DAILY TABLET, FILM COATED, EXTENDED RELEASE ORAL
Refills: 0 | Status: ACTIVE | COMMUNITY
Start: 2019-03-19

## 2021-04-12 RX ORDER — CLONIDINE HYDROCHLORIDE 0.1 MG/1
TAKE 1 TABLET TWICE DAILY TABLET ORAL
Refills: 0 | Status: ACTIVE | COMMUNITY
Start: 2019-07-03

## 2021-04-12 NOTE — HPI-TODAY'S VISIT:
60-year-old female known to Dr. Cedillo, followed for common bile duct stones, IPMN, and narcotic bowel syndrome, presenting for follow-up after colonoscopy. She was last seen in the office on 2/22/2021.  A colonoscopy in February 2020 performed for screening purposes was notable for diverticulosis and poor bowel preparation.  She was recommended repeat colonoscopy with 2-day bowel preparation.  She was to continue liquid PPI for management of reflux type symptoms secondary to gastroparesis.  She was to continue her current bowel regimen as she was not having any bowel complaints.  She was noted to be due for abdominal imaging with MRI and MRCP for history of IPMN, as well as labs to assess for leukocytosis, anemia, metabolic imbalance, inflammation, liver and pancreas dysfunction. Labs 4/9/2021:Amylase 50, CRP 3, CMP normal, CBC normal MRI 3/16/2021:Mild hepatic steatosis, scattered hepatic cysts, and fatty infiltration of the pancreas. Colonoscopy 3/29/2021:Good bowel preparation with [place] bowel preparation scale score of 6.  The procedure was performed without difficulty.  A 5 mm polyp was removed from the cecum.  A in the terminal ileum which was biopsied to rule out malignancy.  Tumor was found terminal ileum biopsy revealed well-differentiated neuroendocrine tumor, low-grade (G1)/carcinoid.  No angiolymphatic invasion was identified.  Cecal polypectomy was a tubular adenoma.

## 2021-04-14 ENCOUNTER — OFFICE VISIT (OUTPATIENT)
Dept: URBAN - METROPOLITAN AREA CLINIC 113 | Facility: CLINIC | Age: 61
End: 2021-04-14
Payer: COMMERCIAL

## 2021-04-14 ENCOUNTER — WEB ENCOUNTER (OUTPATIENT)
Dept: URBAN - METROPOLITAN AREA CLINIC 113 | Facility: CLINIC | Age: 61
End: 2021-04-14

## 2021-04-14 VITALS
SYSTOLIC BLOOD PRESSURE: 145 MMHG | DIASTOLIC BLOOD PRESSURE: 77 MMHG | HEIGHT: 63 IN | TEMPERATURE: 98.2 F | WEIGHT: 151 LBS | HEART RATE: 90 BPM | BODY MASS INDEX: 26.75 KG/M2

## 2021-04-14 DIAGNOSIS — K59.03 DRUG-INDUCED CONSTIPATION: ICD-10-CM

## 2021-04-14 DIAGNOSIS — K21.9 GASTROESOPHAGEAL REFLUX DISEASE WITHOUT ESOPHAGITIS: ICD-10-CM

## 2021-04-14 DIAGNOSIS — D49.0 IPMN (INTRADUCTAL PAPILLARY MUCINOUS NEOPLASM): ICD-10-CM

## 2021-04-14 DIAGNOSIS — R11.0 NAUSEA: ICD-10-CM

## 2021-04-14 PROBLEM — 266435005: Status: ACTIVE | Noted: 2021-02-22

## 2021-04-14 PROBLEM — 235675006: Status: ACTIVE | Noted: 2021-02-22

## 2021-04-14 PROCEDURE — 99213 OFFICE O/P EST LOW 20 MIN: CPT | Performed by: INTERNAL MEDICINE

## 2021-04-14 RX ORDER — MORPHINE SULFATE 30 MG/1
TAKE 1 TABLET TWICE DAILY TABLET, FILM COATED, EXTENDED RELEASE ORAL
Refills: 0 | Status: ACTIVE | COMMUNITY
Start: 2019-03-19

## 2021-04-14 RX ORDER — PHENAZOPYRIDINE HYDROCHLORIDE 100 MG/1
TAKE 1 CAPSULE TWICE DAILY TABLET, COATED ORAL
Refills: 0 | Status: ACTIVE | COMMUNITY

## 2021-04-14 RX ORDER — CLONIDINE HYDROCHLORIDE 0.1 MG/1
TAKE 1 TABLET TWICE DAILY TABLET ORAL
Refills: 0 | Status: ACTIVE | COMMUNITY
Start: 2019-07-03

## 2021-04-14 RX ORDER — PANTOPRAZOLE SODIUM 40 MG/1
TAKE ONE TABLET BY MOUTH TWO TIMES A DAY TABLET, DELAYED RELEASE ORAL
Qty: 180 | Refills: 2 | Status: ACTIVE | COMMUNITY

## 2021-04-14 RX ORDER — ONDANSETRON HYDROCHLORIDE 4 MG/1
TAKE 1 TABLET BY MOUTH EVERY 4 TO 6 HOURS AS NEEDED FOR NAUSEA TABLET, FILM COATED ORAL
Qty: 30 | Refills: 0 | Status: ACTIVE | COMMUNITY

## 2021-04-14 RX ORDER — VALACYCLOVIR HYDROCHLORIDE 500 MG/1
1 TABLET TABLET, FILM COATED ORAL ONCE A DAY
Refills: 0 | Status: ACTIVE | COMMUNITY
Start: 2020-03-05

## 2021-04-14 RX ORDER — POLYETHYLENE GLYCOL 3350, SODIUM SULFATE, SODIUM CHLORIDE, POTASSIUM CHLORIDE, ASCORBIC ACID, SODIUM ASCORBATE 140-9-5.2G
AS DIRECTED KIT ORAL
Status: ACTIVE | COMMUNITY
Start: 2021-02-26

## 2021-04-14 RX ORDER — PRAVASTATIN SODIUM 20 MG/1
TAKE 1 TABLET DAILY AS DIRECTED TABLET ORAL
Refills: 0 | Status: ACTIVE | COMMUNITY
Start: 2019-07-02

## 2021-04-14 RX ORDER — FENOFIBRATE 145 MG/1
1 TABLET TABLET, FILM COATED ORAL ONCE A DAY
Status: ACTIVE | COMMUNITY

## 2021-04-14 RX ORDER — OXYCODONE 15 MG/1
TAKE 1 TABLET EVERY 6 HOURS AS NEEDED FOR PAIN TABLET ORAL
Refills: 0 | Status: ACTIVE | COMMUNITY

## 2021-04-14 RX ORDER — PHENOBARBITAL, HYOSCYAMINE SULFATE, ATROPINE SULFATE, SCOPOLAMINE HYDROBROMIDE 16.2; .1037; .0194; .0065 MG/1; MG/1; MG/1; MG/1
1 TABLET TABLET ORAL THREE TIMES A DAY
Qty: 90 TABLET | Refills: 3 | Status: ON HOLD | COMMUNITY
Start: 2021-01-20 | End: 2021-05-20

## 2021-04-14 RX ORDER — LANSOPRAZOLE 30 MG/1
1 TABLET TABLET, ORALLY DISINTEGRATING, DELAYED RELEASE ORAL ONCE A DAY
Qty: 30 | Refills: 3 | Status: ACTIVE | COMMUNITY

## 2021-04-14 RX ORDER — ONDANSETRON HYDROCHLORIDE 4 MG/1
1 TABLET TABLET, FILM COATED ORAL
Qty: 30 TABLETS | Refills: 0 | Status: ACTIVE | COMMUNITY

## 2021-04-14 NOTE — EXAM-PHYSICAL EXAM
She is alert and oriented to person place and situation in no acute distress.  There is no scleral icterus.

## 2021-04-14 NOTE — HPI-TODAY'S VISIT:
60-year-old female known to Dr. Cedillo, followed for common bile duct stones, IPMN, and narcotic bowel syndrome, presenting for follow-up after colonoscopy. She was last seen in the office on 2/22/2021.  A colonoscopy in February 2020 performed for screening purposes was notable for diverticulosis and poor bowel preparation.  She was recommended repeat colonoscopy with 2-day bowel preparation.  She was to continue liquid PPI for management of reflux type symptoms secondary to gastroparesis.  She was to continue her current bowel regimen as she was not having any bowel complaints.  She was noted to be due for abdominal imaging with MRI and MRCP for history of IPMN, as well as labs to assess for leukocytosis, anemia, metabolic imbalance, inflammation, liver and pancreas dysfunction. Labs 4/9/2021:Amylase 50, CRP 3, CMP normal, CBC normal MRI 3/16/2021:Mild hepatic steatosis, scattered hepatic cysts, and fatty infiltration of the pancreas. Colonoscopy 3/29/2021:Good bowel preparation with [place] bowel preparation scale score of 6.  The procedure was performed without difficulty.  A 5 mm polyp was removed from the cecum.  A in the terminal ileum which was biopsied to rule out malignancy.  Tumor was found terminal ileum biopsy revealed well-differentiated neuroendocrine tumor, low-grade (G1)/carcinoid.  No angiolymphatic invasion was identified.  Cecal polypectomy was a tubular adenoma. The patient is a very delightful 60-year-old female who I followed for common bile duct stones, IPMN and narcotic bowel syndrome.  She was last seen in September 2019.  Her narcotic bowel syndrome was complicated by constipation gastroesophageal reflux disease and gastroparesis.  Her last upper endoscopy was in 2006.  This revealed gastric stasis.  Her last ERCP was in 2016.  There were no stones or sludge seen despite MRI showing stones and sludge.  There were no gastric abnormalities seen.  Last colonoscopy was in February 2020.  This was for screening purposes.  Quality of the prep was poor.  Patient had diverticulosis present.  She was scheduled for repeat colonoscopy with a 2-day prep.  Last imaging studies are CT abdomen and pelvis with contrast in August 2019 which showed hepatic steatosis and an ovarian cyst.  Last MRI was July 2018.  This revealed a 1.3 cm IPMN within the head of the pancreas.she's  She states in the last year she had surgery on her low back but this did not help.  She had a very bad experience with the surgery.  She was unable to come back for repeat colonoscopy due to the surgery and covid issues.she's having more nausea and heartburn.  She is on pantoprazole which is known poor treatment in patients with gastroparesis.  She states her constipation is under good control. The patient is doing very well.  We did an extensive review of carcinoid and surgery versus no surgery.  Patient would like to proceed with surgery.

## 2021-04-21 ENCOUNTER — TELEPHONE ENCOUNTER (OUTPATIENT)
Dept: URBAN - METROPOLITAN AREA CLINIC 113 | Facility: CLINIC | Age: 61
End: 2021-04-21

## 2021-07-22 ENCOUNTER — ERX REFILL RESPONSE (OUTPATIENT)
Dept: URBAN - METROPOLITAN AREA CLINIC 113 | Facility: CLINIC | Age: 61
End: 2021-07-22

## 2021-07-22 RX ORDER — ONDANSETRON HYDROCHLORIDE 4 MG/1
TAKE ONE TABLET BY MOUTH EVERY 6 HOURS AS NEEDED TABLET, FILM COATED ORAL
Qty: 30 TABLET | Refills: 1 | OUTPATIENT

## 2021-07-22 RX ORDER — ONDANSETRON HYDROCHLORIDE 4 MG/1
1 TABLET TABLET, FILM COATED ORAL
Qty: 30 TABLETS | Refills: 0 | OUTPATIENT

## 2021-08-31 ENCOUNTER — ERX REFILL RESPONSE (OUTPATIENT)
Dept: URBAN - METROPOLITAN AREA CLINIC 113 | Facility: CLINIC | Age: 61
End: 2021-08-31

## 2021-08-31 RX ORDER — ONDANSETRON HYDROCHLORIDE 4 MG/1
TAKE ONE TABLET BY MOUTH EVERY 6 HOURS AS NEEDED TABLET, FILM COATED ORAL
Qty: 30 TABLET | Refills: 1 | OUTPATIENT

## 2021-09-13 ENCOUNTER — ERX REFILL RESPONSE (OUTPATIENT)
Dept: URBAN - METROPOLITAN AREA CLINIC 113 | Facility: CLINIC | Age: 61
End: 2021-09-13

## 2021-09-13 RX ORDER — PANTOPRAZOLE SODIUM 40 MG/1
TAKE ONE TABLET BY MOUTH TWICE DAILY TABLET, DELAYED RELEASE ORAL
Qty: 180 TABLET | Refills: 3 | OUTPATIENT

## 2021-09-13 RX ORDER — PANTOPRAZOLE SODIUM 40 MG/1
TAKE ONE TABLET BY MOUTH TWO TIMES A DAY TABLET, DELAYED RELEASE ORAL
Qty: 180 | Refills: 2 | OUTPATIENT

## 2022-07-11 ENCOUNTER — ERX REFILL RESPONSE (OUTPATIENT)
Dept: URBAN - METROPOLITAN AREA CLINIC 113 | Facility: CLINIC | Age: 62
End: 2022-07-11

## 2022-07-11 RX ORDER — PANTOPRAZOLE SODIUM 40 MG/1
TAKE ONE TABLET BY MOUTH TWICE DAILY TABLET, DELAYED RELEASE ORAL
Qty: 180 TABLET | Refills: 2 | OUTPATIENT

## 2022-07-11 RX ORDER — PANTOPRAZOLE SODIUM 40 MG/1
TAKE ONE TABLET BY MOUTH TWICE DAILY TABLET, DELAYED RELEASE ORAL
Qty: 180 TABLET | Refills: 3 | OUTPATIENT

## 2023-06-13 ENCOUNTER — LAB OUTSIDE AN ENCOUNTER (OUTPATIENT)
Dept: URBAN - METROPOLITAN AREA CLINIC 113 | Facility: CLINIC | Age: 63
End: 2023-06-13

## 2023-06-13 ENCOUNTER — OFFICE VISIT (OUTPATIENT)
Dept: URBAN - METROPOLITAN AREA CLINIC 113 | Facility: CLINIC | Age: 63
End: 2023-06-13
Payer: COMMERCIAL

## 2023-06-13 VITALS
TEMPERATURE: 97.7 F | HEIGHT: 63 IN | SYSTOLIC BLOOD PRESSURE: 151 MMHG | HEART RATE: 105 BPM | DIASTOLIC BLOOD PRESSURE: 81 MMHG | BODY MASS INDEX: 27.64 KG/M2 | RESPIRATION RATE: 18 BRPM | WEIGHT: 156 LBS

## 2023-06-13 DIAGNOSIS — R10.13 EPIGASTRIC PAIN: ICD-10-CM

## 2023-06-13 DIAGNOSIS — K21.9 GASTROESOPHAGEAL REFLUX DISEASE WITHOUT ESOPHAGITIS: ICD-10-CM

## 2023-06-13 DIAGNOSIS — D3A.012 BENIGN CARCINOID TUMOR OF ILEUM: ICD-10-CM

## 2023-06-13 DIAGNOSIS — R11.0 NAUSEA: ICD-10-CM

## 2023-06-13 DIAGNOSIS — K59.03 DRUG-INDUCED CONSTIPATION: ICD-10-CM

## 2023-06-13 DIAGNOSIS — D49.0 IPMN (INTRADUCTAL PAPILLARY MUCINOUS NEOPLASM): ICD-10-CM

## 2023-06-13 DIAGNOSIS — K31.84 GASTROPARESIS: ICD-10-CM

## 2023-06-13 DIAGNOSIS — I72.8 SPLENIC ARTERY ANEURYSM: ICD-10-CM

## 2023-06-13 DIAGNOSIS — R74.8 ELEVATED LIVER ENZYMES: ICD-10-CM

## 2023-06-13 PROBLEM — 70405009: Status: ACTIVE | Noted: 2023-06-13

## 2023-06-13 PROCEDURE — 99214 OFFICE O/P EST MOD 30 MIN: CPT | Performed by: INTERNAL MEDICINE

## 2023-06-13 RX ORDER — PHENAZOPYRIDINE HYDROCHLORIDE 100 MG/1
TAKE 1 CAPSULE TWICE DAILY TABLET, COATED ORAL
Refills: 0 | Status: ON HOLD | COMMUNITY

## 2023-06-13 RX ORDER — POLYETHYLENE GLYCOL 3350, SODIUM SULFATE, SODIUM CHLORIDE, POTASSIUM CHLORIDE, ASCORBIC ACID, SODIUM ASCORBATE 140-9-5.2G
AS DIRECTED KIT ORAL
Status: ON HOLD | COMMUNITY
Start: 2021-02-26

## 2023-06-13 RX ORDER — FENOFIBRATE 145 MG/1
1 TABLET TABLET, FILM COATED ORAL ONCE A DAY
Status: ACTIVE | COMMUNITY

## 2023-06-13 RX ORDER — PANTOPRAZOLE SODIUM 40 MG/1
TAKE ONE TABLET BY MOUTH TWICE DAILY TABLET, DELAYED RELEASE ORAL
Qty: 180 TABLET | Refills: 2 | Status: ACTIVE | COMMUNITY

## 2023-06-13 RX ORDER — MORPHINE SULFATE 30 MG/1
TAKE 1 TABLET TWICE DAILY TABLET, FILM COATED, EXTENDED RELEASE ORAL
Refills: 0 | Status: ACTIVE | COMMUNITY
Start: 2019-03-19

## 2023-06-13 RX ORDER — TIZANIDINE HYDROCHLORIDE 4 MG/1
1 TABLET AS NEEDED TABLET ORAL THREE TIMES A DAY
Status: ACTIVE | COMMUNITY

## 2023-06-13 RX ORDER — ONDANSETRON HYDROCHLORIDE 4 MG/1
TAKE ONE TABLET BY MOUTH EVERY 6 HOURS AS NEEDED TABLET, FILM COATED ORAL
Qty: 30 TABLET | Refills: 1 | Status: ACTIVE | COMMUNITY

## 2023-06-13 RX ORDER — CLONIDINE HYDROCHLORIDE 0.1 MG/1
TAKE 1 TABLET TWICE DAILY TABLET ORAL
Refills: 0 | Status: ON HOLD | COMMUNITY
Start: 2019-07-03

## 2023-06-13 RX ORDER — LANSOPRAZOLE 30 MG/1
1 TABLET TABLET, ORALLY DISINTEGRATING, DELAYED RELEASE ORAL ONCE A DAY
Qty: 30 | Refills: 3 | Status: ON HOLD | COMMUNITY

## 2023-06-13 RX ORDER — VALACYCLOVIR HYDROCHLORIDE 500 MG/1
1 TABLET TABLET, FILM COATED ORAL ONCE A DAY
Refills: 0 | Status: ACTIVE | COMMUNITY
Start: 2020-03-05

## 2023-06-13 RX ORDER — OXYCODONE 15 MG/1
TAKE 1 TABLET EVERY 6 HOURS AS NEEDED FOR PAIN TABLET ORAL
Refills: 0 | Status: ACTIVE | COMMUNITY

## 2023-06-13 RX ORDER — PRAVASTATIN SODIUM 20 MG/1
TAKE 1 TABLET DAILY AS DIRECTED TABLET ORAL
Refills: 0 | Status: ACTIVE | COMMUNITY
Start: 2019-07-02

## 2023-06-13 NOTE — HPI-TODAY'S VISIT:
60-year-old female known to Dr. Cedillo, followed for common bile duct stones, IPMN, and narcotic bowel syndrome, presenting for follow-up after colonoscopy. She was last seen in the office on 2/22/2021.  A colonoscopy in February 2020 performed for screening purposes was notable for diverticulosis and poor bowel preparation.  She was recommended repeat colonoscopy with 2-day bowel preparation.  She was to continue liquid PPI for management of reflux type symptoms secondary to gastroparesis.  She was to continue her current bowel regimen as she was not having any bowel complaints.  She was noted to be due for abdominal imaging with MRI and MRCP for history of IPMN, as well as labs to assess for leukocytosis, anemia, metabolic imbalance, inflammation, liver and pancreas dysfunction. Labs 4/9/2021:Amylase 50, CRP 3, CMP normal, CBC normal MRI 3/16/2021:Mild hepatic steatosis, scattered hepatic cysts, and fatty infiltration of the pancreas. Colonoscopy 3/29/2021:Good bowel preparation with [place] bowel preparation scale score of 6.  The procedure was performed without difficulty.  A 5 mm polyp was removed from the cecum.  A in the terminal ileum which was biopsied to rule out malignancy.  Tumor was found terminal ileum biopsy revealed well-differentiated neuroendocrine tumor, low-grade (G1)/carcinoid.  No angiolymphatic invasion was identified.  Cecal polypectomy was a tubular adenoma. The patient is a very delightful 60-year-old female who I followed for common bile duct stones, IPMN and narcotic bowel syndrome.  She was last seen in September 2019.  Her narcotic bowel syndrome was complicated by constipation gastroesophageal reflux disease and gastroparesis.  Her last upper endoscopy was in 2006.  This revealed gastric stasis.  Her last ERCP was in 2016.  There were no stones or sludge seen despite MRI showing stones and sludge.  There were no gastric abnormalities seen.  Last colonoscopy was in February 2020.  This was for screening purposes.  Quality of the prep was poor.  Patient had diverticulosis present.  She was scheduled for repeat colonoscopy with a 2-day prep.  Last imaging studies are CT abdomen and pelvis with contrast in August 2019 which showed hepatic steatosis and an ovarian cyst.  Last MRI was July 2018.  This revealed a 1.3 cm IPMN within the head of the pancreas.she's  She states in the last year she had surgery on her low back but this did not help.  She had a very bad experience with the surgery.  She was unable to come back for repeat colonoscopy due to the surgery and covid issues.she's having more nausea and heartburn.  She is on pantoprazole which is known poor treatment in patients with gastroparesis.  She states her constipation is under good control. The patient is doing very well.  We did an extensive review of carcinoid and surgery versus no surgery.  Patient would like to proceed with surgery. Interval history, 6/13/2023.  Referring records were reviewed.  Laboratory testing from April of this year revealed a normal CBC.  CMP did show an elevated AST at 55 ALT of 45 but normal alkaline phosphatase and total bilirubin.  Hemoglobin A1c was 5.4.  TSH was normal at 2.  CT scan of the abdomen and pelvis with contrast performed in August of last year revealed a left adnexal cyst splenic artery aneurysm.  In regards to the pancreas the IPMN in the head of the pancreas was not seen. The patient states she has alternating diarrhea and constipation.  She states she has yet to see her gynecologist in regards to the ovarian cyst.  Her biggest complaint though is epigastric burning that comes and goes.  She is no longer on Phenergan or Zofran for her nausea.  She sometimes takes Pepto-Bismol.

## 2023-06-18 LAB
A/G RATIO: 1.6
ABSOLUTE BASOPHILS: 29
ABSOLUTE EOSINOPHILS: 40
ABSOLUTE LYMPHOCYTES: 1539
ABSOLUTE MONOCYTES: 433
ABSOLUTE NEUTROPHILS: 3659
ACTIN (SMOOTH MUSCLE) ANTIBODY (IGG): <20
ALBUMIN: 4.7
ALKALINE PHOSPHATASE: 90
ALT (SGPT): 36
AST (SGOT): 40
BASOPHILS: 0.5
BILIRUBIN, TOTAL: 0.3
BUN/CREATININE RATIO: (no result)
BUN: 11
CALCIUM: 9.5
CARBON DIOXIDE, TOTAL: 26
CHLORIDE: 105
CREATININE: 0.71
EGFR: 97
EOSINOPHILS: 0.7
FERRITIN, SERUM: 277
GGT: 57
GLOBULIN, TOTAL: 2.9
GLUCOSE: 104
HEMATOCRIT: 43.6
HEMOGLOBIN: 14.7
HEPATITIS B SURFACE ANTIGEN: (no result)
HEPATITIS C ANTIBODY: (no result)
INDEX: 0.07
IRON BIND.CAP.(TIBC): 369
IRON SATURATION: 24
IRON: 88
LYMPHOCYTES: 27
MCH: 32.7
MCHC: 33.7
MCV: 97.1
MONOCYTES: 7.6
MPV: 10.2
NEUTROPHILS: 64.2
PLATELET COUNT: 221
POTASSIUM: 4
PROTEIN, TOTAL: 7.6
RDW: 11.9
RED BLOOD CELL COUNT: 4.49
SODIUM: 143
WHITE BLOOD CELL COUNT: 5.7

## 2023-07-23 ENCOUNTER — ERX REFILL RESPONSE (OUTPATIENT)
Dept: URBAN - METROPOLITAN AREA CLINIC 113 | Facility: CLINIC | Age: 63
End: 2023-07-23

## 2023-07-23 RX ORDER — PANTOPRAZOLE SODIUM 40 MG/1
TAKE ONE TABLET BY MOUTH TWICE DAILY TABLET, DELAYED RELEASE ORAL
Qty: 180 TABLET | Refills: 2 | OUTPATIENT

## 2023-10-16 ENCOUNTER — TELEPHONE ENCOUNTER (OUTPATIENT)
Dept: URBAN - METROPOLITAN AREA CLINIC 113 | Facility: CLINIC | Age: 63
End: 2023-10-16

## 2023-10-17 ENCOUNTER — OFFICE VISIT (OUTPATIENT)
Dept: URBAN - METROPOLITAN AREA CLINIC 113 | Facility: CLINIC | Age: 63
End: 2023-10-17

## 2023-12-07 ENCOUNTER — DASHBOARD ENCOUNTERS (OUTPATIENT)
Age: 63
End: 2023-12-07

## 2023-12-07 ENCOUNTER — OFFICE VISIT (OUTPATIENT)
Dept: URBAN - METROPOLITAN AREA CLINIC 113 | Facility: CLINIC | Age: 63
End: 2023-12-07
Payer: COMMERCIAL

## 2023-12-07 ENCOUNTER — OFFICE VISIT (OUTPATIENT)
Dept: URBAN - METROPOLITAN AREA CLINIC 113 | Facility: CLINIC | Age: 63
End: 2023-12-07

## 2023-12-07 VITALS
BODY MASS INDEX: 28.17 KG/M2 | SYSTOLIC BLOOD PRESSURE: 139 MMHG | DIASTOLIC BLOOD PRESSURE: 111 MMHG | HEIGHT: 63 IN | HEART RATE: 90 BPM | WEIGHT: 159 LBS | TEMPERATURE: 97.6 F | RESPIRATION RATE: 16 BRPM

## 2023-12-07 DIAGNOSIS — R11.0 NAUSEA: ICD-10-CM

## 2023-12-07 DIAGNOSIS — D3A.012 BENIGN CARCINOID TUMOR OF ILEUM: ICD-10-CM

## 2023-12-07 DIAGNOSIS — R10.13 EPIGASTRIC PAIN: ICD-10-CM

## 2023-12-07 DIAGNOSIS — K21.9 GASTROESOPHAGEAL REFLUX DISEASE WITHOUT ESOPHAGITIS: ICD-10-CM

## 2023-12-07 DIAGNOSIS — R74.8 ELEVATED LIVER ENZYMES: ICD-10-CM

## 2023-12-07 DIAGNOSIS — K59.03 DRUG-INDUCED CONSTIPATION: ICD-10-CM

## 2023-12-07 DIAGNOSIS — K31.84 GASTROPARESIS: ICD-10-CM

## 2023-12-07 DIAGNOSIS — D49.0 IPMN (INTRADUCTAL PAPILLARY MUCINOUS NEOPLASM): ICD-10-CM

## 2023-12-07 DIAGNOSIS — I72.8 SPLENIC ARTERY ANEURYSM: ICD-10-CM

## 2023-12-07 PROCEDURE — 99214 OFFICE O/P EST MOD 30 MIN: CPT

## 2023-12-07 RX ORDER — PHENAZOPYRIDINE HYDROCHLORIDE 100 MG/1
TAKE 1 CAPSULE TWICE DAILY TABLET, COATED ORAL
Refills: 0 | Status: ON HOLD | COMMUNITY

## 2023-12-07 RX ORDER — POLYETHYLENE GLYCOL 3350, SODIUM SULFATE, SODIUM CHLORIDE, POTASSIUM CHLORIDE, ASCORBIC ACID, SODIUM ASCORBATE 140-9-5.2G
AS DIRECTED KIT ORAL
Status: ON HOLD | COMMUNITY
Start: 2021-02-26

## 2023-12-07 RX ORDER — CLONIDINE HYDROCHLORIDE 0.1 MG/1
TAKE 1 TABLET TWICE DAILY TABLET ORAL
Refills: 0 | Status: ON HOLD | COMMUNITY
Start: 2019-07-03

## 2023-12-07 RX ORDER — MORPHINE SULFATE 30 MG/1
TAKE 1 TABLET TWICE DAILY TABLET, FILM COATED, EXTENDED RELEASE ORAL
Refills: 0 | Status: ACTIVE | COMMUNITY
Start: 2019-03-19

## 2023-12-07 RX ORDER — OXYCODONE 15 MG/1
TAKE 1 TABLET EVERY 6 HOURS AS NEEDED FOR PAIN TABLET ORAL
Refills: 0 | Status: ACTIVE | COMMUNITY

## 2023-12-07 RX ORDER — SUCRALFATE 1 G
1 TABLET ON AN EMPTY STOMACH TABLET ORAL TWICE A DAY
Qty: 60 | Refills: 3 | OUTPATIENT
Start: 2023-12-07 | End: 2024-04-05

## 2023-12-07 RX ORDER — ONDANSETRON HYDROCHLORIDE 4 MG/1
TAKE ONE TABLET BY MOUTH EVERY 6 HOURS AS NEEDED TABLET, FILM COATED ORAL
Qty: 30 TABLET | Refills: 1 | Status: ACTIVE | COMMUNITY

## 2023-12-07 RX ORDER — PRAVASTATIN SODIUM 20 MG/1
TAKE 1 TABLET DAILY AS DIRECTED TABLET ORAL
Refills: 0 | Status: ACTIVE | COMMUNITY
Start: 2019-07-02

## 2023-12-07 RX ORDER — PANTOPRAZOLE SODIUM 40 MG/1
TAKE ONE TABLET BY MOUTH TWICE DAILY TABLET, DELAYED RELEASE ORAL
Qty: 180 TABLET | Refills: 2 | Status: ACTIVE | COMMUNITY

## 2023-12-07 RX ORDER — LANSOPRAZOLE 30 MG/1
1 TABLET TABLET, ORALLY DISINTEGRATING, DELAYED RELEASE ORAL ONCE A DAY
Qty: 30 | Refills: 3 | Status: ON HOLD | COMMUNITY

## 2023-12-07 RX ORDER — FENOFIBRATE 145 MG/1
1 TABLET TABLET, FILM COATED ORAL ONCE A DAY
Status: ACTIVE | COMMUNITY

## 2023-12-07 RX ORDER — VALACYCLOVIR HYDROCHLORIDE 500 MG/1
1 TABLET TABLET, FILM COATED ORAL ONCE A DAY
Refills: 0 | Status: ACTIVE | COMMUNITY
Start: 2020-03-05

## 2023-12-07 RX ORDER — TIZANIDINE HYDROCHLORIDE 4 MG/1
1 TABLET AS NEEDED TABLET ORAL THREE TIMES A DAY
Status: ACTIVE | COMMUNITY

## 2023-12-07 NOTE — HPI-TODAY'S VISIT:
60-year-old female known to Dr. Cedillo, followed for common bile duct stones, IPMN, and narcotic bowel syndrome, presenting for follow-up after colonoscopy. She was last seen in the office on 2/22/2021.  A colonoscopy in February 2020 performed for screening purposes was notable for diverticulosis and poor bowel preparation.  She was recommended repeat colonoscopy with 2-day bowel preparation.  She was to continue liquid PPI for management of reflux type symptoms secondary to gastroparesis.  She was to continue her current bowel regimen as she was not having any bowel complaints.  She was noted to be due for abdominal imaging with MRI and MRCP for history of IPMN, as well as labs to assess for leukocytosis, anemia, metabolic imbalance, inflammation, liver and pancreas dysfunction. Labs 4/9/2021:Amylase 50, CRP 3, CMP normal, CBC normal MRI 3/16/2021:Mild hepatic steatosis, scattered hepatic cysts, and fatty infiltration of the pancreas. Colonoscopy 3/29/2021:Good bowel preparation with [place] bowel preparation scale score of 6.  The procedure was performed without difficulty.  A 5 mm polyp was removed from the cecum.  A in the terminal ileum which was biopsied to rule out malignancy.  Tumor was found terminal ileum biopsy revealed well-differentiated neuroendocrine tumor, low-grade (G1)/carcinoid.  No angiolymphatic invasion was identified.  Cecal polypectomy was a tubular adenoma. The patient is a very delightful 60-year-old female who I followed for common bile duct stones, IPMN and narcotic bowel syndrome.  She was last seen in September 2019.  Her narcotic bowel syndrome was complicated by constipation gastroesophageal reflux disease and gastroparesis.  Her last upper endoscopy was in 2006.  This revealed gastric stasis.  Her last ERCP was in 2016.  There were no stones or sludge seen despite MRI showing stones and sludge.  There were no gastric abnormalities seen.  Last colonoscopy was in February 2020.  This was for screening purposes.  Quality of the prep was poor.  Patient had diverticulosis present.  She was scheduled for repeat colonoscopy with a 2-day prep.  Last imaging studies are CT abdomen and pelvis with contrast in August 2019 which showed hepatic steatosis and an ovarian cyst.  Last MRI was July 2018.  This revealed a 1.3 cm IPMN within the head of the pancreas.she's  She states in the last year she had surgery on her low back but this did not help.  She had a very bad experience with the surgery.  She was unable to come back for repeat colonoscopy due to the surgery and covid issues.she's having more nausea and heartburn.  She is on pantoprazole which is known poor treatment in patients with gastroparesis.  She states her constipation is under good control. The patient is doing very well.  We did an extensive review of carcinoid and surgery versus no surgery.  Patient would like to proceed with surgery. Interval history, 6/13/2023.  Referring records were reviewed.  Laboratory testing from April of this year revealed a normal CBC.  CMP did show an elevated AST at 55 ALT of 45 but normal alkaline phosphatase and total bilirubin.  Hemoglobin A1c was 5.4.  TSH was normal at 2.  CT scan of the abdomen and pelvis with contrast performed in August of last year revealed a left adnexal cyst splenic artery aneurysm.  In regards to the pancreas the IPMN in the head of the pancreas was not seen. The patient states she has alternating diarrhea and constipation.  She states she has yet to see her gynecologist in regards to the ovarian cyst.  Her biggest complaint though is epigastric burning that comes and goes.  She is no longer on Phenergan or Zofran for her nausea.  She sometimes takes Pepto-Bismol.  Interval history, 12/7/2023: 63year-old female presents for follow-up.  She was last seen on 6/13/2023.  She is planned for ultrasound to assess for biliary pathology and if negative we would consider EGD for her epigastric symptoms.  Labs 4/13/2023:Negative ASMA, normal CBC, glucose 104, normal total bilirubin, normal alk phos, AST 40, ALT 36, ferritin 277, normal GGT, negative hep B surface antigen, negative hep C antibody, normal iron panel.  We do not have ultrasound for review.  Labs 11/9/2023: Glucose 109, normal LFTs, total cholesterol 234.  She had back surgery in September. She has only 25% improvement in her pain. She was hoping to come off of her opioids however she is still reliant. She continues to have epigastric burning. PPI therapy is ineffective. Her LFTs has improved. She denies excessive ETOH use. SHe had a CT last month for her cancer surveilance. THis was with Dr. Roldan. We do not have this for review, Her bowels move well on Miralax daily.

## 2024-02-08 ENCOUNTER — OV EP (OUTPATIENT)
Dept: URBAN - METROPOLITAN AREA CLINIC 113 | Facility: CLINIC | Age: 64
End: 2024-02-08

## 2024-06-06 ENCOUNTER — OFFICE VISIT (OUTPATIENT)
Dept: URBAN - METROPOLITAN AREA CLINIC 113 | Facility: CLINIC | Age: 64
End: 2024-06-06
Payer: COMMERCIAL

## 2024-06-06 VITALS
SYSTOLIC BLOOD PRESSURE: 146 MMHG | HEIGHT: 63 IN | DIASTOLIC BLOOD PRESSURE: 86 MMHG | HEART RATE: 92 BPM | BODY MASS INDEX: 29.13 KG/M2 | TEMPERATURE: 97.9 F | RESPIRATION RATE: 16 BRPM | WEIGHT: 164.4 LBS

## 2024-06-06 DIAGNOSIS — R11.0 NAUSEA: ICD-10-CM

## 2024-06-06 DIAGNOSIS — K59.03 DRUG-INDUCED CONSTIPATION: ICD-10-CM

## 2024-06-06 DIAGNOSIS — K31.84 GASTROPARESIS: ICD-10-CM

## 2024-06-06 DIAGNOSIS — K21.9 GASTROESOPHAGEAL REFLUX DISEASE WITHOUT ESOPHAGITIS: ICD-10-CM

## 2024-06-06 PROCEDURE — 99213 OFFICE O/P EST LOW 20 MIN: CPT

## 2024-06-06 RX ORDER — TIZANIDINE HYDROCHLORIDE 4 MG/1
1 TABLET AS NEEDED TABLET ORAL THREE TIMES A DAY
Status: ACTIVE | COMMUNITY

## 2024-06-06 RX ORDER — MORPHINE SULFATE 30 MG/1
TAKE 1 TABLET TWICE DAILY TABLET, FILM COATED, EXTENDED RELEASE ORAL
Refills: 0 | Status: ACTIVE | COMMUNITY
Start: 2019-03-19

## 2024-06-06 RX ORDER — SERTRALINE 25 MG/1
1 TABLET TABLET, FILM COATED ORAL ONCE A DAY
Status: ACTIVE | COMMUNITY

## 2024-06-06 RX ORDER — PRAVASTATIN SODIUM 20 MG/1
TAKE 1 TABLET DAILY AS DIRECTED TABLET ORAL
Refills: 0 | Status: ACTIVE | COMMUNITY
Start: 2019-07-02

## 2024-06-06 RX ORDER — LANSOPRAZOLE 30 MG/1
1 TABLET TABLET, ORALLY DISINTEGRATING, DELAYED RELEASE ORAL ONCE A DAY
Qty: 30 | Refills: 3 | Status: ON HOLD | COMMUNITY

## 2024-06-06 RX ORDER — ONDANSETRON HYDROCHLORIDE 4 MG/1
TAKE ONE TABLET BY MOUTH EVERY 6 HOURS AS NEEDED TABLET, FILM COATED ORAL
Qty: 30 TABLET | Refills: 1 | Status: ACTIVE | COMMUNITY

## 2024-06-06 RX ORDER — FENOFIBRATE 145 MG/1
1 TABLET TABLET, FILM COATED ORAL ONCE A DAY
Status: ACTIVE | COMMUNITY

## 2024-06-06 RX ORDER — PHENAZOPYRIDINE HYDROCHLORIDE 100 MG/1
TAKE 1 CAPSULE TWICE DAILY TABLET, COATED ORAL
Refills: 0 | Status: ON HOLD | COMMUNITY

## 2024-06-06 RX ORDER — POLYETHYLENE GLYCOL 3350, SODIUM SULFATE, SODIUM CHLORIDE, POTASSIUM CHLORIDE, ASCORBIC ACID, SODIUM ASCORBATE 140-9-5.2G
AS DIRECTED KIT ORAL
Status: ON HOLD | COMMUNITY
Start: 2021-02-26

## 2024-06-06 RX ORDER — OXYCODONE 15 MG/1
TAKE 1 TABLET EVERY 6 HOURS AS NEEDED FOR PAIN TABLET ORAL
Refills: 0 | Status: ACTIVE | COMMUNITY

## 2024-06-06 RX ORDER — CLONIDINE HYDROCHLORIDE 0.1 MG/1
TAKE 1 TABLET TWICE DAILY TABLET ORAL
Refills: 0 | Status: ON HOLD | COMMUNITY
Start: 2019-07-03

## 2024-06-06 RX ORDER — PANTOPRAZOLE SODIUM 40 MG/1
TAKE ONE TABLET BY MOUTH TWICE DAILY TABLET, DELAYED RELEASE ORAL
Qty: 180 TABLET | Refills: 2 | Status: ACTIVE | COMMUNITY

## 2024-06-06 RX ORDER — VALACYCLOVIR HYDROCHLORIDE 500 MG/1
1 TABLET TABLET, FILM COATED ORAL ONCE A DAY
Refills: 0 | Status: ACTIVE | COMMUNITY
Start: 2020-03-05

## 2024-06-06 NOTE — HPI-TODAY'S VISIT:
60-year-old female known to Dr. Cedillo, followed for common bile duct stones, IPMN, and narcotic bowel syndrome, presenting for follow-up after colonoscopy. She was last seen in the office on 2/22/2021.  A colonoscopy in February 2020 performed for screening purposes was notable for diverticulosis and poor bowel preparation.  She was recommended repeat colonoscopy with 2-day bowel preparation.  She was to continue liquid PPI for management of reflux type symptoms secondary to gastroparesis.  She was to continue her current bowel regimen as she was not having any bowel complaints.  She was noted to be due for abdominal imaging with MRI and MRCP for history of IPMN, as well as labs to assess for leukocytosis, anemia, metabolic imbalance, inflammation, liver and pancreas dysfunction. Labs 4/9/2021:Amylase 50, CRP 3, CMP normal, CBC normal MRI 3/16/2021:Mild hepatic steatosis, scattered hepatic cysts, and fatty infiltration of the pancreas. Colonoscopy 3/29/2021:Good bowel preparation with [place] bowel preparation scale score of 6.  The procedure was performed without difficulty.  A 5 mm polyp was removed from the cecum.  A in the terminal ileum which was biopsied to rule out malignancy.  Tumor was found terminal ileum biopsy revealed well-differentiated neuroendocrine tumor, low-grade (G1)/carcinoid.  No angiolymphatic invasion was identified.  Cecal polypectomy was a tubular adenoma. The patient is a very delightful 60-year-old female who I followed for common bile duct stones, IPMN and narcotic bowel syndrome.  She was last seen in September 2019.  Her narcotic bowel syndrome was complicated by constipation gastroesophageal reflux disease and gastroparesis.  Her last upper endoscopy was in 2006.  This revealed gastric stasis.  Her last ERCP was in 2016.  There were no stones or sludge seen despite MRI showing stones and sludge.  There were no gastric abnormalities seen.  Last colonoscopy was in February 2020.  This was for screening purposes.  Quality of the prep was poor.  Patient had diverticulosis present.  She was scheduled for repeat colonoscopy with a 2-day prep.  Last imaging studies are CT abdomen and pelvis with contrast in August 2019 which showed hepatic steatosis and an ovarian cyst.  Last MRI was July 2018.  This revealed a 1.3 cm IPMN within the head of the pancreas.she's  She states in the last year she had surgery on her low back but this did not help.  She had a very bad experience with the surgery.  She was unable to come back for repeat colonoscopy due to the surgery and covid issues.she's having more nausea and heartburn.  She is on pantoprazole which is known poor treatment in patients with gastroparesis.  She states her constipation is under good control. The patient is doing very well.  We did an extensive review of carcinoid and surgery versus no surgery.  Patient would like to proceed with surgery. Interval history, 6/13/2023.  Referring records were reviewed.  Laboratory testing from April of this year revealed a normal CBC.  CMP did show an elevated AST at 55 ALT of 45 but normal alkaline phosphatase and total bilirubin.  Hemoglobin A1c was 5.4.  TSH was normal at 2.  CT scan of the abdomen and pelvis with contrast performed in August of last year revealed a left adnexal cyst splenic artery aneurysm.  In regards to the pancreas the IPMN in the head of the pancreas was not seen. The patient states she has alternating diarrhea and constipation.  She states she has yet to see her gynecologist in regards to the ovarian cyst.  Her biggest complaint though is epigastric burning that comes and goes.  She is no longer on Phenergan or Zofran for her nausea.  She sometimes takes Pepto-Bismol.  Interval history, 12/7/2023: 63year-old female presents for follow-up.  She was last seen on 6/13/2023.  She is planned for ultrasound to assess for biliary pathology and if negative we would consider EGD for her epigastric symptoms.  Labs 4/13/2023:Negative ASMA, normal CBC, glucose 104, normal total bilirubin, normal alk phos, AST 40, ALT 36, ferritin 277, normal GGT, negative hep B surface antigen, negative hep C antibody, normal iron panel.  We do not have ultrasound for review.  Labs 11/9/2023: Glucose 109, normal LFTs, total cholesterol 234.  She had back surgery in September. She has only 25% improvement in her pain. She was hoping to come off of her opioids however she is still reliant. She continues to have epigastric burning. PPI therapy is ineffective. Her LFTs has improved. She denies excessive ETOH use. SHe had a CT last month for her cancer surveilance. THis was with Dr. Roldan. We do not have this for review, Her bowels move well on Miralax daily.  Interval history, 63-year-old female presents for follow-up.  We would obtain recent CT imaging for review and if normal consider EGD.  She was try Carafate in the interim.  We obtained her CT scanning of the abdomen and pelvis performed in November 2023.  This revealed no evidence for disease recurrence or metastatic disease.  It did note moderate to severe hepatic steatosis, left pelvic cyst/seroma which had increased in size.  There were no gallstones or other abnormalities of the gallbladder.  She was informed that if her epigastric burning was still present bothersome we could proceed with EGD.  When contacted the patient states she is not having any issues and she would call us back if needed.  She was on Reglan for "awhile." She does have twitching of her hands that she has not noticed. Her nausea has worsened of late as well as her epigastric burning. Carafate was not effective. SHe does not recall trying FDgard. She is not able to come off of narcotics.

## 2024-07-24 ENCOUNTER — TELEPHONE ENCOUNTER (OUTPATIENT)
Dept: URBAN - METROPOLITAN AREA CLINIC 113 | Facility: CLINIC | Age: 64
End: 2024-07-24

## 2024-07-24 RX ORDER — FAMOTIDINE 40 MG/1
1 TABLET AT BEDTIME TABLET, FILM COATED ORAL ONCE A DAY
Qty: 90 TABLET | Refills: 0 | OUTPATIENT
Start: 2024-07-26

## 2024-07-24 RX ORDER — ONDANSETRON 4 MG/1
1 TABLET ON THE TONGUE AND ALLOW TO DISSOLVE TABLET, ORALLY DISINTEGRATING ORAL
Qty: 40 | Refills: 0 | OUTPATIENT
Start: 2024-07-26

## 2024-08-09 ENCOUNTER — TELEPHONE ENCOUNTER (OUTPATIENT)
Dept: URBAN - METROPOLITAN AREA CLINIC 113 | Facility: CLINIC | Age: 64
End: 2024-08-09

## 2024-08-13 ENCOUNTER — TELEPHONE ENCOUNTER (OUTPATIENT)
Dept: URBAN - METROPOLITAN AREA CLINIC 113 | Facility: CLINIC | Age: 64
End: 2024-08-13

## 2024-08-13 RX ORDER — ONDANSETRON 4 MG/1
1 TABLET ON THE TONGUE AND ALLOW TO DISSOLVE TABLET, ORALLY DISINTEGRATING ORAL
Qty: 90 | Refills: 0 | OUTPATIENT
Start: 2024-08-13

## 2024-08-14 ENCOUNTER — TELEPHONE ENCOUNTER (OUTPATIENT)
Dept: URBAN - METROPOLITAN AREA CLINIC 113 | Facility: CLINIC | Age: 64
End: 2024-08-14

## 2024-09-09 ENCOUNTER — ERX REFILL RESPONSE (OUTPATIENT)
Dept: URBAN - METROPOLITAN AREA CLINIC 113 | Facility: CLINIC | Age: 64
End: 2024-09-09

## 2024-09-09 RX ORDER — PANTOPRAZOLE SODIUM 40 MG/1
TAKE ONE TABLET BY MOUTH TWICE DAILY TABLET, DELAYED RELEASE ORAL
Qty: 180 TABLET | Refills: 2 | OUTPATIENT

## 2024-09-25 ENCOUNTER — LAB OUTSIDE AN ENCOUNTER (OUTPATIENT)
Dept: URBAN - METROPOLITAN AREA CLINIC 113 | Facility: CLINIC | Age: 64
End: 2024-09-25

## 2024-09-25 ENCOUNTER — OFFICE VISIT (OUTPATIENT)
Dept: URBAN - METROPOLITAN AREA CLINIC 113 | Facility: CLINIC | Age: 64
End: 2024-09-25
Payer: COMMERCIAL

## 2024-09-25 VITALS
TEMPERATURE: 98.1 F | SYSTOLIC BLOOD PRESSURE: 162 MMHG | BODY MASS INDEX: 27.32 KG/M2 | HEART RATE: 93 BPM | HEIGHT: 63 IN | RESPIRATION RATE: 20 BRPM | WEIGHT: 154.2 LBS | DIASTOLIC BLOOD PRESSURE: 85 MMHG

## 2024-09-25 DIAGNOSIS — D3A.012 BENIGN CARCINOID TUMOR OF ILEUM: ICD-10-CM

## 2024-09-25 DIAGNOSIS — K31.84 GASTROPARESIS: ICD-10-CM

## 2024-09-25 DIAGNOSIS — D49.0 IPMN (INTRADUCTAL PAPILLARY MUCINOUS NEOPLASM): ICD-10-CM

## 2024-09-25 DIAGNOSIS — I72.8 SPLENIC ARTERY ANEURYSM: ICD-10-CM

## 2024-09-25 DIAGNOSIS — K21.9 GASTROESOPHAGEAL REFLUX DISEASE WITHOUT ESOPHAGITIS: ICD-10-CM

## 2024-09-25 DIAGNOSIS — K75.81 NASH (NONALCOHOLIC STEATOHEPATITIS): ICD-10-CM

## 2024-09-25 DIAGNOSIS — R74.8 ELEVATED LIVER ENZYMES: ICD-10-CM

## 2024-09-25 DIAGNOSIS — K59.03 DRUG-INDUCED CONSTIPATION: ICD-10-CM

## 2024-09-25 DIAGNOSIS — R11.0 NAUSEA: ICD-10-CM

## 2024-09-25 PROBLEM — 442685003: Status: ACTIVE | Noted: 2024-09-25

## 2024-09-25 PROCEDURE — 99214 OFFICE O/P EST MOD 30 MIN: CPT | Performed by: INTERNAL MEDICINE

## 2024-09-25 RX ORDER — CLONIDINE HYDROCHLORIDE 0.1 MG/1
TAKE 1 TABLET TWICE DAILY TABLET ORAL
Refills: 0 | Status: ON HOLD | COMMUNITY
Start: 2019-07-03

## 2024-09-25 RX ORDER — OXYCODONE 15 MG/1
TAKE 1 TABLET EVERY 6 HOURS AS NEEDED FOR PAIN TABLET ORAL
Refills: 0 | Status: ACTIVE | COMMUNITY

## 2024-09-25 RX ORDER — LANSOPRAZOLE 30 MG/1
1 TABLET TABLET, ORALLY DISINTEGRATING, DELAYED RELEASE ORAL ONCE A DAY
Qty: 30 | Refills: 3 | Status: ON HOLD | COMMUNITY

## 2024-09-25 RX ORDER — FAMOTIDINE 40 MG/1
1 TABLET AT BEDTIME TABLET, FILM COATED ORAL ONCE A DAY
Qty: 90 TABLET | Refills: 0 | Status: ACTIVE | COMMUNITY
Start: 2024-07-26

## 2024-09-25 RX ORDER — ONDANSETRON HYDROCHLORIDE 4 MG/1
TAKE ONE TABLET BY MOUTH EVERY 6 HOURS AS NEEDED TABLET, FILM COATED ORAL
Qty: 30 TABLET | Refills: 1 | Status: ACTIVE | COMMUNITY

## 2024-09-25 RX ORDER — FENOFIBRATE 145 MG/1
1 TABLET TABLET, FILM COATED ORAL ONCE A DAY
Status: ACTIVE | COMMUNITY

## 2024-09-25 RX ORDER — MORPHINE SULFATE 30 MG/1
TAKE 1 TABLET TWICE DAILY TABLET, FILM COATED, EXTENDED RELEASE ORAL
Refills: 0 | Status: ACTIVE | COMMUNITY
Start: 2019-03-19

## 2024-09-25 RX ORDER — SERTRALINE HYDROCHLORIDE 100 MG/1
1 TABLET TABLET, FILM COATED ORAL ONCE A DAY
Status: ACTIVE | COMMUNITY

## 2024-09-25 RX ORDER — PRAVASTATIN SODIUM 20 MG/1
TAKE 1 TABLET DAILY AS DIRECTED TABLET ORAL
Refills: 0 | Status: ACTIVE | COMMUNITY
Start: 2019-07-02

## 2024-09-25 RX ORDER — PANTOPRAZOLE SODIUM 40 MG/1
TAKE ONE TABLET BY MOUTH TWICE DAILY TABLET, DELAYED RELEASE ORAL
Qty: 180 TABLET | Refills: 2 | Status: ACTIVE | COMMUNITY

## 2024-09-25 RX ORDER — POLYETHYLENE GLYCOL 3350, SODIUM SULFATE, SODIUM CHLORIDE, POTASSIUM CHLORIDE, ASCORBIC ACID, SODIUM ASCORBATE 140-9-5.2G
AS DIRECTED KIT ORAL
Status: ON HOLD | COMMUNITY
Start: 2021-02-26

## 2024-09-25 RX ORDER — TIZANIDINE HYDROCHLORIDE 4 MG/1
1 TABLET AS NEEDED TABLET ORAL THREE TIMES A DAY
Status: ACTIVE | COMMUNITY

## 2024-09-25 RX ORDER — VALACYCLOVIR HYDROCHLORIDE 500 MG/1
1 TABLET TABLET, FILM COATED ORAL ONCE A DAY
Refills: 0 | Status: ACTIVE | COMMUNITY
Start: 2020-03-05

## 2024-09-25 RX ORDER — ONDANSETRON 4 MG/1
1 TABLET ON THE TONGUE AND ALLOW TO DISSOLVE TABLET, ORALLY DISINTEGRATING ORAL
Qty: 90 | Refills: 0 | Status: ON HOLD | COMMUNITY
Start: 2024-08-13

## 2024-09-25 RX ORDER — ONDANSETRON 4 MG/1
1 TABLET ON THE TONGUE AND ALLOW TO DISSOLVE TABLET, ORALLY DISINTEGRATING ORAL
Qty: 40 | Refills: 0 | Status: ACTIVE | COMMUNITY
Start: 2024-07-26

## 2024-09-25 RX ORDER — PHENAZOPYRIDINE HYDROCHLORIDE 100 MG/1
TAKE 1 CAPSULE TWICE DAILY TABLET, COATED ORAL
Refills: 0 | Status: ON HOLD | COMMUNITY

## 2024-09-25 NOTE — HPI-TODAY'S VISIT:
60-year-old female known to Dr. Cedillo, followed for common bile duct stones, IPMN, and narcotic bowel syndrome, presenting for follow-up after colonoscopy. She was last seen in the office on 2/22/2021.  A colonoscopy in February 2020 performed for screening purposes was notable for diverticulosis and poor bowel preparation.  She was recommended repeat colonoscopy with 2-day bowel preparation.  She was to continue liquid PPI for management of reflux type symptoms secondary to gastroparesis.  She was to continue her current bowel regimen as she was not having any bowel complaints.  She was noted to be due for abdominal imaging with MRI and MRCP for history of IPMN, as well as labs to assess for leukocytosis, anemia, metabolic imbalance, inflammation, liver and pancreas dysfunction. Labs 4/9/2021:Amylase 50, CRP 3, CMP normal, CBC normal MRI 3/16/2021:Mild hepatic steatosis, scattered hepatic cysts, and fatty infiltration of the pancreas. Colonoscopy 3/29/2021:Good bowel preparation with [place] bowel preparation scale score of 6.  The procedure was performed without difficulty.  A 5 mm polyp was removed from the cecum.  A in the terminal ileum which was biopsied to rule out malignancy.  Tumor was found terminal ileum biopsy revealed well-differentiated neuroendocrine tumor, low-grade (G1)/carcinoid.  No angiolymphatic invasion was identified.  Cecal polypectomy was a tubular adenoma. The patient is a very delightful 60-year-old female who I followed for common bile duct stones, IPMN and narcotic bowel syndrome.  She was last seen in September 2019.  Her narcotic bowel syndrome was complicated by constipation gastroesophageal reflux disease and gastroparesis.  Her last upper endoscopy was in 2006.  This revealed gastric stasis.  Her last ERCP was in 2016.  There were no stones or sludge seen despite MRI showing stones and sludge.  There were no gastric abnormalities seen.  Last colonoscopy was in February 2020.  This was for screening purposes.  Quality of the prep was poor.  Patient had diverticulosis present.  She was scheduled for repeat colonoscopy with a 2-day prep.  Last imaging studies are CT abdomen and pelvis with contrast in August 2019 which showed hepatic steatosis and an ovarian cyst.  Last MRI was July 2018.  This revealed a 1.3 cm IPMN within the head of the pancreas.she's  She states in the last year she had surgery on her low back but this did not help.  She had a very bad experience with the surgery.  She was unable to come back for repeat colonoscopy due to the surgery and covid issues.she's having more nausea and heartburn.  She is on pantoprazole which is known poor treatment in patients with gastroparesis.  She states her constipation is under good control. The patient is doing very well.  We did an extensive review of carcinoid and surgery versus no surgery.  Patient would like to proceed with surgery. Interval history, 6/13/2023.  Referring records were reviewed.  Laboratory testing from April of this year revealed a normal CBC.  CMP did show an elevated AST at 55 ALT of 45 but normal alkaline phosphatase and total bilirubin.  Hemoglobin A1c was 5.4.  TSH was normal at 2.  CT scan of the abdomen and pelvis with contrast performed in August of last year revealed a left adnexal cyst splenic artery aneurysm.  In regards to the pancreas the IPMN in the head of the pancreas was not seen. The patient states she has alternating diarrhea and constipation.  She states she has yet to see her gynecologist in regards to the ovarian cyst.  Her biggest complaint though is epigastric burning that comes and goes.  She is no longer on Phenergan or Zofran for her nausea.  She sometimes takes Pepto-Bismol.  Interval history, 12/7/2023: 63year-old female presents for follow-up.  She was last seen on 6/13/2023.  She is planned for ultrasound to assess for biliary pathology and if negative we would consider EGD for her epigastric symptoms.  Labs 4/13/2023:Negative ASMA, normal CBC, glucose 104, normal total bilirubin, normal alk phos, AST 40, ALT 36, ferritin 277, normal GGT, negative hep B surface antigen, negative hep C antibody, normal iron panel.  We do not have ultrasound for review.  Labs 11/9/2023: Glucose 109, normal LFTs, total cholesterol 234.  She had back surgery in September. She has only 25% improvement in her pain. She was hoping to come off of her opioids however she is still reliant. She continues to have epigastric burning. PPI therapy is ineffective. Her LFTs has improved. She denies excessive ETOH use. SHe had a CT last month for her cancer surveilance. THis was with Dr. Roldan. We do not have this for review, Her bowels move well on Miralax daily.  Interval history, 63-year-old female presents for follow-up.  We would obtain recent CT imaging for review and if normal consider EGD.  She was try Carafate in the interim.  We obtained her CT scanning of the abdomen and pelvis performed in November 2023.  This revealed no evidence for disease recurrence or metastatic disease.  It did note moderate to severe hepatic steatosis, left pelvic cyst/seroma which had increased in size.  There were no gallstones or other abnormalities of the gallbladder.  She was informed that if her epigastric burning was still present bothersome we could proceed with EGD.  When contacted the patient states she is not having any issues and she would call us back if needed.  She was on Reglan for "awhile." She does have twitching of her hands that she has not noticed. Her nausea has worsened of late as well as her epigastric burning. Carafate was not effective. SHe does not recall trying FDgard. She is not able to come off of narcotics. Interval history, 9/25/2024. Gastric emptying study performed September 12 revealed normal gastric emptying. Laboratory testing from July of this year revealed normal creatinine.  Ca1 25 was normal.  Laboratory testing from June 24 of this year revealed elevated AST at 62 ALT 42 otherwise normal CMP, CBC showing normal hemoglobin of 14 slightly low end of normal platelet count of 174.  MCV was slightly high at 97. The patient states her main complaint currently is occasional regurgitation and heartburn.  She is not currently on a acid reflux medication at this time.  She rarely takes promethazine anymore.  Her bowel movements are good with MiraLAX.  Overall she states she is doing quite well.

## 2024-09-25 NOTE — EXAM-PHYSICAL EXAM
She is alert and oriented to person place and situation no acute distress.  She does have occasional rhythmic motions.

## 2024-10-11 ENCOUNTER — TELEPHONE ENCOUNTER (OUTPATIENT)
Dept: URBAN - METROPOLITAN AREA CLINIC 113 | Facility: CLINIC | Age: 64
End: 2024-10-11

## 2024-10-11 RX ORDER — SUCRALFATE 1 G/1
1 TABLET TABLET ORAL
Qty: 120 TABLET | Refills: 1 | OUTPATIENT
Start: 2024-10-11 | End: 2024-12-09

## 2024-10-29 ENCOUNTER — LAB OUTSIDE AN ENCOUNTER (OUTPATIENT)
Dept: URBAN - METROPOLITAN AREA CLINIC 113 | Facility: CLINIC | Age: 64
End: 2024-10-29

## 2024-10-29 ENCOUNTER — OFFICE VISIT (OUTPATIENT)
Dept: URBAN - METROPOLITAN AREA CLINIC 113 | Facility: CLINIC | Age: 64
End: 2024-10-29
Payer: COMMERCIAL

## 2024-10-29 VITALS
HEIGHT: 63 IN | TEMPERATURE: 97.7 F | HEART RATE: 98 BPM | BODY MASS INDEX: 27.78 KG/M2 | SYSTOLIC BLOOD PRESSURE: 147 MMHG | DIASTOLIC BLOOD PRESSURE: 86 MMHG | RESPIRATION RATE: 20 BRPM | WEIGHT: 156.8 LBS

## 2024-10-29 DIAGNOSIS — D49.0 IPMN (INTRADUCTAL PAPILLARY MUCINOUS NEOPLASM): ICD-10-CM

## 2024-10-29 DIAGNOSIS — R74.8 ELEVATED LIVER ENZYMES: ICD-10-CM

## 2024-10-29 DIAGNOSIS — I72.8 SPLENIC ARTERY ANEURYSM: ICD-10-CM

## 2024-10-29 DIAGNOSIS — R14.0 BLOATING: ICD-10-CM

## 2024-10-29 DIAGNOSIS — D3A.012 BENIGN CARCINOID TUMOR OF ILEUM: ICD-10-CM

## 2024-10-29 DIAGNOSIS — R11.0 NAUSEA: ICD-10-CM

## 2024-10-29 DIAGNOSIS — K59.03 DRUG-INDUCED CONSTIPATION: ICD-10-CM

## 2024-10-29 DIAGNOSIS — R10.84 GENERALIZED ABDOMINAL PAIN: ICD-10-CM

## 2024-10-29 DIAGNOSIS — K21.9 GASTROESOPHAGEAL REFLUX DISEASE WITHOUT ESOPHAGITIS: ICD-10-CM

## 2024-10-29 DIAGNOSIS — K75.81 NASH (NONALCOHOLIC STEATOHEPATITIS): ICD-10-CM

## 2024-10-29 DIAGNOSIS — K31.84 GASTROPARESIS: ICD-10-CM

## 2024-10-29 PROCEDURE — 99214 OFFICE O/P EST MOD 30 MIN: CPT | Performed by: INTERNAL MEDICINE

## 2024-10-29 RX ORDER — ONDANSETRON 4 MG/1
1 TABLET ON THE TONGUE AND ALLOW TO DISSOLVE TABLET, ORALLY DISINTEGRATING ORAL
Qty: 40 | Refills: 0 | Status: ACTIVE | COMMUNITY
Start: 2024-07-26

## 2024-10-29 RX ORDER — FAMOTIDINE 40 MG/1
1 TABLET AT BEDTIME TABLET, FILM COATED ORAL ONCE A DAY
Qty: 90 TABLET | Refills: 0 | Status: ACTIVE | COMMUNITY
Start: 2024-07-26

## 2024-10-29 RX ORDER — PHENAZOPYRIDINE HYDROCHLORIDE 100 MG/1
TAKE 1 CAPSULE TWICE DAILY TABLET, COATED ORAL
Refills: 0 | Status: ON HOLD | COMMUNITY

## 2024-10-29 RX ORDER — CLONIDINE HYDROCHLORIDE 0.1 MG/1
TAKE 1 TABLET TWICE DAILY TABLET ORAL
Refills: 0 | Status: ON HOLD | COMMUNITY
Start: 2019-07-03

## 2024-10-29 RX ORDER — MORPHINE SULFATE 30 MG/1
TAKE 1 TABLET TWICE DAILY TABLET, FILM COATED, EXTENDED RELEASE ORAL
Refills: 0 | Status: ACTIVE | COMMUNITY
Start: 2019-03-19

## 2024-10-29 RX ORDER — METRONIDAZOLE 500 MG/1
1 TABLET TABLET ORAL TWICE A DAY
Qty: 20 TABLET | Refills: 0 | OUTPATIENT
Start: 2024-10-29 | End: 2024-11-07

## 2024-10-29 RX ORDER — TIZANIDINE HYDROCHLORIDE 4 MG/1
1 TABLET AS NEEDED TABLET ORAL THREE TIMES A DAY
Status: ACTIVE | COMMUNITY

## 2024-10-29 RX ORDER — VALACYCLOVIR HYDROCHLORIDE 500 MG/1
1 TABLET TABLET, FILM COATED ORAL ONCE A DAY
Refills: 0 | Status: ACTIVE | COMMUNITY
Start: 2020-03-05

## 2024-10-29 RX ORDER — PANTOPRAZOLE SODIUM 40 MG/1
TAKE ONE TABLET BY MOUTH TWICE DAILY TABLET, DELAYED RELEASE ORAL
Qty: 180 TABLET | Refills: 2 | Status: ACTIVE | COMMUNITY

## 2024-10-29 RX ORDER — ONDANSETRON HYDROCHLORIDE 4 MG/1
TAKE ONE TABLET BY MOUTH EVERY 6 HOURS AS NEEDED TABLET, FILM COATED ORAL
Qty: 30 TABLET | Refills: 1 | Status: ACTIVE | COMMUNITY

## 2024-10-29 RX ORDER — SUCRALFATE 1 G/1
1 TABLET TABLET ORAL
Qty: 120 TABLET | Refills: 1 | Status: ACTIVE | COMMUNITY
Start: 2024-10-11 | End: 2024-12-09

## 2024-10-29 RX ORDER — PRAVASTATIN SODIUM 20 MG/1
TAKE 1 TABLET DAILY AS DIRECTED TABLET ORAL
Refills: 0 | Status: ACTIVE | COMMUNITY
Start: 2019-07-02

## 2024-10-29 RX ORDER — SERTRALINE HYDROCHLORIDE 100 MG/1
1 TABLET TABLET, FILM COATED ORAL ONCE A DAY
Status: ACTIVE | COMMUNITY

## 2024-10-29 RX ORDER — LANSOPRAZOLE 30 MG/1
1 TABLET TABLET, ORALLY DISINTEGRATING, DELAYED RELEASE ORAL ONCE A DAY
Qty: 30 | Refills: 3 | Status: ON HOLD | COMMUNITY

## 2024-10-29 RX ORDER — POLYETHYLENE GLYCOL 3350, SODIUM SULFATE, SODIUM CHLORIDE, POTASSIUM CHLORIDE, ASCORBIC ACID, SODIUM ASCORBATE 140-9-5.2G
AS DIRECTED KIT ORAL
Status: ON HOLD | COMMUNITY
Start: 2021-02-26

## 2024-10-29 RX ORDER — OXYCODONE 15 MG/1
TAKE 1 TABLET EVERY 6 HOURS AS NEEDED FOR PAIN TABLET ORAL
Refills: 0 | Status: ACTIVE | COMMUNITY

## 2024-10-29 RX ORDER — ONDANSETRON 4 MG/1
1 TABLET ON THE TONGUE AND ALLOW TO DISSOLVE TABLET, ORALLY DISINTEGRATING ORAL
Qty: 90 | Refills: 0 | Status: ON HOLD | COMMUNITY
Start: 2024-08-13

## 2024-10-29 RX ORDER — FENOFIBRATE 145 MG/1
1 TABLET TABLET, FILM COATED ORAL ONCE A DAY
Status: ACTIVE | COMMUNITY

## 2024-10-29 NOTE — HPI-TODAY'S VISIT:
60-year-old female known to Dr. Cedillo, followed for common bile duct stones, IPMN, and narcotic bowel syndrome, presenting for follow-up after colonoscopy. She was last seen in the office on 2/22/2021.  A colonoscopy in February 2020 performed for screening purposes was notable for diverticulosis and poor bowel preparation.  She was recommended repeat colonoscopy with 2-day bowel preparation.  She was to continue liquid PPI for management of reflux type symptoms secondary to gastroparesis.  She was to continue her current bowel regimen as she was not having any bowel complaints.  She was noted to be due for abdominal imaging with MRI and MRCP for history of IPMN, as well as labs to assess for leukocytosis, anemia, metabolic imbalance, inflammation, liver and pancreas dysfunction. Labs 4/9/2021:Amylase 50, CRP 3, CMP normal, CBC normal MRI 3/16/2021:Mild hepatic steatosis, scattered hepatic cysts, and fatty infiltration of the pancreas. Colonoscopy 3/29/2021:Good bowel preparation with [place] bowel preparation scale score of 6.  The procedure was performed without difficulty.  A 5 mm polyp was removed from the cecum.  A in the terminal ileum which was biopsied to rule out malignancy.  Tumor was found terminal ileum biopsy revealed well-differentiated neuroendocrine tumor, low-grade (G1)/carcinoid.  No angiolymphatic invasion was identified.  Cecal polypectomy was a tubular adenoma. The patient is a very delightful 60-year-old female who I followed for common bile duct stones, IPMN and narcotic bowel syndrome.  She was last seen in September 2019.  Her narcotic bowel syndrome was complicated by constipation gastroesophageal reflux disease and gastroparesis.  Her last upper endoscopy was in 2006.  This revealed gastric stasis.  Her last ERCP was in 2016.  There were no stones or sludge seen despite MRI showing stones and sludge.  There were no gastric abnormalities seen.  Last colonoscopy was in February 2020.  This was for screening purposes.  Quality of the prep was poor.  Patient had diverticulosis present.  She was scheduled for repeat colonoscopy with a 2-day prep.  Last imaging studies are CT abdomen and pelvis with contrast in August 2019 which showed hepatic steatosis and an ovarian cyst.  Last MRI was July 2018.  This revealed a 1.3 cm IPMN within the head of the pancreas.she's  She states in the last year she had surgery on her low back but this did not help.  She had a very bad experience with the surgery.  She was unable to come back for repeat colonoscopy due to the surgery and covid issues.she's having more nausea and heartburn.  She is on pantoprazole which is known poor treatment in patients with gastroparesis.  She states her constipation is under good control. The patient is doing very well.  We did an extensive review of carcinoid and surgery versus no surgery.  Patient would like to proceed with surgery. Interval history, 6/13/2023.  Referring records were reviewed.  Laboratory testing from April of this year revealed a normal CBC.  CMP did show an elevated AST at 55 ALT of 45 but normal alkaline phosphatase and total bilirubin.  Hemoglobin A1c was 5.4.  TSH was normal at 2.  CT scan of the abdomen and pelvis with contrast performed in August of last year revealed a left adnexal cyst splenic artery aneurysm.  In regards to the pancreas the IPMN in the head of the pancreas was not seen. The patient states she has alternating diarrhea and constipation.  She states she has yet to see her gynecologist in regards to the ovarian cyst.  Her biggest complaint though is epigastric burning that comes and goes.  She is no longer on Phenergan or Zofran for her nausea.  She sometimes takes Pepto-Bismol.  Interval history, 12/7/2023: 63year-old female presents for follow-up.  She was last seen on 6/13/2023.  She is planned for ultrasound to assess for biliary pathology and if negative we would consider EGD for her epigastric symptoms.  Labs 4/13/2023:Negative ASMA, normal CBC, glucose 104, normal total bilirubin, normal alk phos, AST 40, ALT 36, ferritin 277, normal GGT, negative hep B surface antigen, negative hep C antibody, normal iron panel.  We do not have ultrasound for review.  Labs 11/9/2023: Glucose 109, normal LFTs, total cholesterol 234.  She had back surgery in September. She has only 25% improvement in her pain. She was hoping to come off of her opioids however she is still reliant. She continues to have epigastric burning. PPI therapy is ineffective. Her LFTs has improved. She denies excessive ETOH use. SHe had a CT last month for her cancer surveilance. THis was with Dr. Roldan. We do not have this for review, Her bowels move well on Miralax daily.  Interval history, 63-year-old female presents for follow-up.  We would obtain recent CT imaging for review and if normal consider EGD.  She was try Carafate in the interim.  We obtained her CT scanning of the abdomen and pelvis performed in November 2023.  This revealed no evidence for disease recurrence or metastatic disease.  It did note moderate to severe hepatic steatosis, left pelvic cyst/seroma which had increased in size.  There were no gallstones or other abnormalities of the gallbladder.  She was informed that if her epigastric burning was still present bothersome we could proceed with EGD.  When contacted the patient states she is not having any issues and she would call us back if needed.  She was on Reglan for "awhile." She does have twitching of her hands that she has not noticed. Her nausea has worsened of late as well as her epigastric burning. Carafate was not effective. SHe does not recall trying FDgard. She is not able to come off of narcotics. Interval history, 9/25/2024. Gastric emptying study performed September 12 revealed normal gastric emptying. Laboratory testing from July of this year revealed normal creatinine.  Ca1 25 was normal.  Laboratory testing from June 24 of this year revealed elevated AST at 62 ALT 42 otherwise normal CMP, CBC showing normal hemoglobin of 14 slightly low end of normal platelet count of 174.  MCV was slightly high at 97. The patient states her main complaint currently is occasional regurgitation and heartburn.  She is not currently on a acid reflux medication at this time.  She rarely takes promethazine anymore.  Her bowel movements are good with MiraLAX.  Overall she states she is doing quite well. Interval history, 10/29/2024. Laboratory testing dated October 17 of this month revealed a normal CMP except for AST of 64 and ALT of 46.  Iron studies showed normal percent saturation at 37.  Anti-smooth muscle antibody was normal at 7.  Ferritin was slightly elevated at 376.  Antimitochondrial antibody was negative. Ultrasound with fibrosis score revealed median elastography of 10.1 consistent with F3 fibrosis. The patient states her main problem currently is severe bloating especially in the first half of the day with some nausea and abdominal discomfort.  She is having 1-3 bowel movements daily.  We reviewed her ultrasound results at length.

## 2024-11-04 ENCOUNTER — TELEPHONE ENCOUNTER (OUTPATIENT)
Dept: URBAN - METROPOLITAN AREA CLINIC 113 | Facility: CLINIC | Age: 64
End: 2024-11-04

## 2024-11-13 ENCOUNTER — OFFICE VISIT (OUTPATIENT)
Dept: URBAN - METROPOLITAN AREA CLINIC 107 | Facility: CLINIC | Age: 64
End: 2024-11-13

## 2024-11-19 ENCOUNTER — TELEPHONE ENCOUNTER (OUTPATIENT)
Dept: URBAN - METROPOLITAN AREA CLINIC 23 | Facility: CLINIC | Age: 64
End: 2024-11-19

## 2024-11-22 ENCOUNTER — TELEPHONE ENCOUNTER (OUTPATIENT)
Dept: URBAN - METROPOLITAN AREA CLINIC 113 | Facility: CLINIC | Age: 64
End: 2024-11-22

## 2024-12-11 ENCOUNTER — TELEPHONE ENCOUNTER (OUTPATIENT)
Dept: URBAN - METROPOLITAN AREA CLINIC 113 | Facility: CLINIC | Age: 64
End: 2024-12-11

## 2024-12-23 ENCOUNTER — OFFICE VISIT (OUTPATIENT)
Dept: URBAN - METROPOLITAN AREA CLINIC 113 | Facility: CLINIC | Age: 64
End: 2024-12-23

## 2024-12-26 ENCOUNTER — TELEPHONE ENCOUNTER (OUTPATIENT)
Dept: URBAN - METROPOLITAN AREA CLINIC 113 | Facility: CLINIC | Age: 64
End: 2024-12-26

## 2025-01-08 ENCOUNTER — LAB OUTSIDE AN ENCOUNTER (OUTPATIENT)
Dept: URBAN - METROPOLITAN AREA CLINIC 113 | Facility: CLINIC | Age: 65
End: 2025-01-08

## 2025-01-27 ENCOUNTER — CLAIMS CREATED FROM THE CLAIM WINDOW (OUTPATIENT)
Dept: URBAN - METROPOLITAN AREA SURGERY CENTER 25 | Facility: SURGERY CENTER | Age: 65
End: 2025-01-27
Payer: COMMERCIAL

## 2025-01-27 ENCOUNTER — TELEPHONE ENCOUNTER (OUTPATIENT)
Dept: URBAN - METROPOLITAN AREA CLINIC 113 | Facility: CLINIC | Age: 65
End: 2025-01-27

## 2025-01-27 ENCOUNTER — CLAIMS CREATED FROM THE CLAIM WINDOW (OUTPATIENT)
Dept: URBAN - METROPOLITAN AREA CLINIC 4 | Facility: CLINIC | Age: 65
End: 2025-01-27
Payer: COMMERCIAL

## 2025-01-27 DIAGNOSIS — K31.89 OTHER DISEASES OF STOMACH AND DUODENUM: ICD-10-CM

## 2025-01-27 PROCEDURE — 43239 EGD BIOPSY SINGLE/MULTIPLE: CPT | Performed by: INTERNAL MEDICINE

## 2025-01-27 PROCEDURE — 88305 TISSUE EXAM BY PATHOLOGIST: CPT | Performed by: PATHOLOGY

## 2025-01-27 PROCEDURE — 00731 ANES UPR GI NDSC PX NOS: CPT | Performed by: NURSE ANESTHETIST, CERTIFIED REGISTERED

## 2025-01-27 PROCEDURE — 88312 SPECIAL STAINS GROUP 1: CPT | Performed by: PATHOLOGY

## 2025-01-27 RX ORDER — POLYETHYLENE GLYCOL 3350, SODIUM SULFATE, SODIUM CHLORIDE, POTASSIUM CHLORIDE, ASCORBIC ACID, SODIUM ASCORBATE 140-9-5.2G
AS DIRECTED KIT ORAL
Status: ON HOLD | COMMUNITY
Start: 2021-02-26

## 2025-01-27 RX ORDER — SERTRALINE HYDROCHLORIDE 100 MG/1
1 TABLET TABLET, FILM COATED ORAL ONCE A DAY
Status: ACTIVE | COMMUNITY

## 2025-01-27 RX ORDER — CLONIDINE HYDROCHLORIDE 0.1 MG/1
TAKE 1 TABLET TWICE DAILY TABLET ORAL
Refills: 0 | Status: ON HOLD | COMMUNITY
Start: 2019-07-03

## 2025-01-27 RX ORDER — FENOFIBRATE 145 MG/1
1 TABLET TABLET, FILM COATED ORAL ONCE A DAY
Status: ACTIVE | COMMUNITY

## 2025-01-27 RX ORDER — FAMOTIDINE 40 MG/1
1 TABLET AT BEDTIME TABLET, FILM COATED ORAL ONCE A DAY
Qty: 90 TABLET | Refills: 0 | Status: ACTIVE | COMMUNITY
Start: 2024-07-26

## 2025-01-27 RX ORDER — ONDANSETRON 4 MG/1
1 TABLET ON THE TONGUE AND ALLOW TO DISSOLVE TABLET, ORALLY DISINTEGRATING ORAL
Qty: 90 | Refills: 0 | Status: ON HOLD | COMMUNITY
Start: 2024-08-13

## 2025-01-27 RX ORDER — PRAVASTATIN SODIUM 20 MG/1
TAKE 1 TABLET DAILY AS DIRECTED TABLET ORAL
Refills: 0 | Status: ACTIVE | COMMUNITY
Start: 2019-07-02

## 2025-01-27 RX ORDER — ONDANSETRON 4 MG/1
1 TABLET ON THE TONGUE AND ALLOW TO DISSOLVE TABLET, ORALLY DISINTEGRATING ORAL
Qty: 40 | Refills: 0 | Status: ACTIVE | COMMUNITY
Start: 2024-07-26

## 2025-01-27 RX ORDER — PHENAZOPYRIDINE HYDROCHLORIDE 100 MG/1
TAKE 1 CAPSULE TWICE DAILY TABLET, COATED ORAL
Refills: 0 | Status: ON HOLD | COMMUNITY

## 2025-01-27 RX ORDER — LANSOPRAZOLE 30 MG/1
1 TABLET TABLET, ORALLY DISINTEGRATING, DELAYED RELEASE ORAL ONCE A DAY
Qty: 30 | Refills: 3 | Status: ON HOLD | COMMUNITY

## 2025-01-27 RX ORDER — LINACLOTIDE 290 UG/1
1 CAPSULE CAPSULE, GELATIN COATED ORAL ONCE A DAY
Qty: 30 CAPSULE | Refills: 3 | Status: ACTIVE | COMMUNITY
Start: 2024-12-02 | End: 2025-04-01

## 2025-01-27 RX ORDER — PANTOPRAZOLE SODIUM 40 MG/1
TAKE ONE TABLET BY MOUTH TWICE DAILY TABLET, DELAYED RELEASE ORAL
Qty: 180 TABLET | Refills: 2 | Status: ACTIVE | COMMUNITY

## 2025-01-27 RX ORDER — ONDANSETRON HYDROCHLORIDE 4 MG/1
TAKE ONE TABLET BY MOUTH EVERY 6 HOURS AS NEEDED TABLET, FILM COATED ORAL
Qty: 30 TABLET | Refills: 1 | Status: ACTIVE | COMMUNITY

## 2025-01-27 RX ORDER — AMITRIPTYLINE HYDROCHLORIDE 25 MG/1
1 TABLET AT BEDTIME TABLET, FILM COATED ORAL ONCE A DAY
Qty: 30 TABLET | Refills: 4 | OUTPATIENT
Start: 2025-01-27

## 2025-01-27 RX ORDER — VALACYCLOVIR HYDROCHLORIDE 500 MG/1
1 TABLET TABLET, FILM COATED ORAL ONCE A DAY
Refills: 0 | Status: ACTIVE | COMMUNITY
Start: 2020-03-05

## 2025-01-27 RX ORDER — TIZANIDINE HYDROCHLORIDE 4 MG/1
1 TABLET AS NEEDED TABLET ORAL THREE TIMES A DAY
Status: ACTIVE | COMMUNITY

## 2025-01-27 RX ORDER — OXYCODONE 15 MG/1
TAKE 1 TABLET EVERY 6 HOURS AS NEEDED FOR PAIN TABLET ORAL
Refills: 0 | Status: ACTIVE | COMMUNITY

## 2025-01-27 RX ORDER — MORPHINE SULFATE 30 MG/1
TAKE 1 TABLET TWICE DAILY TABLET, FILM COATED, EXTENDED RELEASE ORAL
Refills: 0 | Status: ACTIVE | COMMUNITY
Start: 2019-03-19

## 2025-02-25 ENCOUNTER — OFFICE VISIT (OUTPATIENT)
Dept: URBAN - METROPOLITAN AREA CLINIC 113 | Facility: CLINIC | Age: 65
End: 2025-02-25
Payer: COMMERCIAL

## 2025-02-25 VITALS
DIASTOLIC BLOOD PRESSURE: 75 MMHG | HEART RATE: 114 BPM | SYSTOLIC BLOOD PRESSURE: 135 MMHG | WEIGHT: 160.2 LBS | HEIGHT: 63 IN | RESPIRATION RATE: 18 BRPM | BODY MASS INDEX: 28.39 KG/M2 | TEMPERATURE: 97.2 F

## 2025-02-25 DIAGNOSIS — K31.84 GASTROPARESIS: ICD-10-CM

## 2025-02-25 DIAGNOSIS — D3A.012 BENIGN CARCINOID TUMOR OF ILEUM: ICD-10-CM

## 2025-02-25 DIAGNOSIS — R10.84 GENERALIZED ABDOMINAL PAIN: ICD-10-CM

## 2025-02-25 DIAGNOSIS — K21.9 GASTROESOPHAGEAL REFLUX DISEASE WITHOUT ESOPHAGITIS: ICD-10-CM

## 2025-02-25 DIAGNOSIS — K75.81 NASH (NONALCOHOLIC STEATOHEPATITIS): ICD-10-CM

## 2025-02-25 DIAGNOSIS — D49.0 IPMN (INTRADUCTAL PAPILLARY MUCINOUS NEOPLASM): ICD-10-CM

## 2025-02-25 DIAGNOSIS — R14.0 BLOATING: ICD-10-CM

## 2025-02-25 DIAGNOSIS — R11.0 NAUSEA: ICD-10-CM

## 2025-02-25 DIAGNOSIS — R74.8 ELEVATED LIVER ENZYMES: ICD-10-CM

## 2025-02-25 DIAGNOSIS — K59.03 DRUG-INDUCED CONSTIPATION: ICD-10-CM

## 2025-02-25 PROCEDURE — 99213 OFFICE O/P EST LOW 20 MIN: CPT | Performed by: INTERNAL MEDICINE

## 2025-02-25 RX ORDER — PANTOPRAZOLE SODIUM 40 MG/1
TAKE ONE TABLET BY MOUTH TWICE DAILY TABLET, DELAYED RELEASE ORAL
Qty: 180 TABLET | Refills: 2 | Status: ACTIVE | COMMUNITY

## 2025-02-25 RX ORDER — ONDANSETRON HYDROCHLORIDE 4 MG/1
TAKE ONE TABLET BY MOUTH EVERY 6 HOURS AS NEEDED TABLET, FILM COATED ORAL
Qty: 30 TABLET | Refills: 1 | Status: ACTIVE | COMMUNITY

## 2025-02-25 RX ORDER — ONDANSETRON 4 MG/1
1 TABLET ON THE TONGUE AND ALLOW TO DISSOLVE TABLET, ORALLY DISINTEGRATING ORAL
Qty: 90 | Refills: 0 | Status: ON HOLD | COMMUNITY
Start: 2024-08-13

## 2025-02-25 RX ORDER — PHENAZOPYRIDINE HYDROCHLORIDE 100 MG/1
TAKE 1 CAPSULE TWICE DAILY TABLET, COATED ORAL
Refills: 0 | Status: ON HOLD | COMMUNITY

## 2025-02-25 RX ORDER — SERTRALINE HYDROCHLORIDE 100 MG/1
1 TABLET TABLET, FILM COATED ORAL ONCE A DAY
Status: ACTIVE | COMMUNITY

## 2025-02-25 RX ORDER — AMITRIPTYLINE HYDROCHLORIDE 25 MG/1
1 TABLET AT BEDTIME TABLET, FILM COATED ORAL ONCE A DAY
Qty: 30 TABLET | Refills: 4 | Status: ACTIVE | COMMUNITY
Start: 2025-01-27

## 2025-02-25 RX ORDER — CLONIDINE HYDROCHLORIDE 0.1 MG/1
TAKE 1 TABLET TWICE DAILY TABLET ORAL
Refills: 0 | Status: ON HOLD | COMMUNITY
Start: 2019-07-03

## 2025-02-25 RX ORDER — POLYETHYLENE GLYCOL 3350, SODIUM SULFATE, SODIUM CHLORIDE, POTASSIUM CHLORIDE, ASCORBIC ACID, SODIUM ASCORBATE 140-9-5.2G
AS DIRECTED KIT ORAL
Status: ON HOLD | COMMUNITY
Start: 2021-02-26

## 2025-02-25 RX ORDER — VALACYCLOVIR HYDROCHLORIDE 500 MG/1
1 TABLET TABLET, FILM COATED ORAL ONCE A DAY
Refills: 0 | Status: ACTIVE | COMMUNITY
Start: 2020-03-05

## 2025-02-25 RX ORDER — MORPHINE SULFATE 30 MG/1
TAKE 1 TABLET TWICE DAILY TABLET, FILM COATED, EXTENDED RELEASE ORAL
Refills: 0 | Status: ACTIVE | COMMUNITY
Start: 2019-03-19

## 2025-02-25 RX ORDER — FENOFIBRATE 145 MG/1
1 TABLET TABLET, FILM COATED ORAL ONCE A DAY
Status: ACTIVE | COMMUNITY

## 2025-02-25 RX ORDER — FAMOTIDINE 40 MG/1
1 TABLET AT BEDTIME TABLET, FILM COATED ORAL ONCE A DAY
Qty: 90 TABLET | Refills: 0 | Status: ACTIVE | COMMUNITY
Start: 2024-07-26

## 2025-02-25 RX ORDER — TIZANIDINE HYDROCHLORIDE 4 MG/1
1 TABLET AS NEEDED TABLET ORAL THREE TIMES A DAY
Status: ACTIVE | COMMUNITY

## 2025-02-25 RX ORDER — PRAVASTATIN SODIUM 20 MG/1
TAKE 1 TABLET DAILY AS DIRECTED TABLET ORAL
Refills: 0 | Status: ACTIVE | COMMUNITY
Start: 2019-07-02

## 2025-02-25 RX ORDER — LANSOPRAZOLE 30 MG/1
1 TABLET TABLET, ORALLY DISINTEGRATING, DELAYED RELEASE ORAL ONCE A DAY
Qty: 30 | Refills: 3 | Status: ON HOLD | COMMUNITY

## 2025-02-25 RX ORDER — ONDANSETRON 4 MG/1
1 TABLET ON THE TONGUE AND ALLOW TO DISSOLVE TABLET, ORALLY DISINTEGRATING ORAL
Qty: 40 | Refills: 0 | Status: ACTIVE | COMMUNITY
Start: 2024-07-26

## 2025-02-25 RX ORDER — LINACLOTIDE 290 UG/1
1 CAPSULE CAPSULE, GELATIN COATED ORAL ONCE A DAY
Qty: 30 CAPSULE | Refills: 3 | Status: ACTIVE | COMMUNITY
Start: 2024-12-02 | End: 2025-04-01

## 2025-02-25 RX ORDER — OXYCODONE 15 MG/1
TAKE 1 TABLET EVERY 6 HOURS AS NEEDED FOR PAIN TABLET ORAL
Refills: 0 | Status: ACTIVE | COMMUNITY

## 2025-02-25 NOTE — HPI-TODAY'S VISIT:
60-year-old female known to Dr. Cedillo, followed for common bile duct stones, IPMN, and narcotic bowel syndrome, presenting for follow-up after colonoscopy. She was last seen in the office on 2/22/2021.  A colonoscopy in February 2020 performed for screening purposes was notable for diverticulosis and poor bowel preparation.  She was recommended repeat colonoscopy with 2-day bowel preparation.  She was to continue liquid PPI for management of reflux type symptoms secondary to gastroparesis.  She was to continue her current bowel regimen as she was not having any bowel complaints.  She was noted to be due for abdominal imaging with MRI and MRCP for history of IPMN, as well as labs to assess for leukocytosis, anemia, metabolic imbalance, inflammation, liver and pancreas dysfunction. Labs 4/9/2021:Amylase 50, CRP 3, CMP normal, CBC normal MRI 3/16/2021:Mild hepatic steatosis, scattered hepatic cysts, and fatty infiltration of the pancreas. Colonoscopy 3/29/2021:Good bowel preparation with [place] bowel preparation scale score of 6.  The procedure was performed without difficulty.  A 5 mm polyp was removed from the cecum.  A in the terminal ileum which was biopsied to rule out malignancy.  Tumor was found terminal ileum biopsy revealed well-differentiated neuroendocrine tumor, low-grade (G1)/carcinoid.  No angiolymphatic invasion was identified.  Cecal polypectomy was a tubular adenoma. The patient is a very delightful 60-year-old female who I followed for common bile duct stones, IPMN and narcotic bowel syndrome.  She was last seen in September 2019.  Her narcotic bowel syndrome was complicated by constipation gastroesophageal reflux disease and gastroparesis.  Her last upper endoscopy was in 2006.  This revealed gastric stasis.  Her last ERCP was in 2016.  There were no stones or sludge seen despite MRI showing stones and sludge.  There were no gastric abnormalities seen.  Last colonoscopy was in February 2020.  This was for screening purposes.  Quality of the prep was poor.  Patient had diverticulosis present.  She was scheduled for repeat colonoscopy with a 2-day prep.  Last imaging studies are CT abdomen and pelvis with contrast in August 2019 which showed hepatic steatosis and an ovarian cyst.  Last MRI was July 2018.  This revealed a 1.3 cm IPMN within the head of the pancreas.she's  She states in the last year she had surgery on her low back but this did not help.  She had a very bad experience with the surgery.  She was unable to come back for repeat colonoscopy due to the surgery and covid issues.she's having more nausea and heartburn.  She is on pantoprazole which is known poor treatment in patients with gastroparesis.  She states her constipation is under good control. The patient is doing very well.  We did an extensive review of carcinoid and surgery versus no surgery.  Patient would like to proceed with surgery. Interval history, 6/13/2023.  Referring records were reviewed.  Laboratory testing from April of this year revealed a normal CBC.  CMP did show an elevated AST at 55 ALT of 45 but normal alkaline phosphatase and total bilirubin.  Hemoglobin A1c was 5.4.  TSH was normal at 2.  CT scan of the abdomen and pelvis with contrast performed in August of last year revealed a left adnexal cyst splenic artery aneurysm.  In regards to the pancreas the IPMN in the head of the pancreas was not seen. The patient states she has alternating diarrhea and constipation.  She states she has yet to see her gynecologist in regards to the ovarian cyst.  Her biggest complaint though is epigastric burning that comes and goes.  She is no longer on Phenergan or Zofran for her nausea.  She sometimes takes Pepto-Bismol.  Interval history, 12/7/2023: 63year-old female presents for follow-up.  She was last seen on 6/13/2023.  She is planned for ultrasound to assess for biliary pathology and if negative we would consider EGD for her epigastric symptoms.  Labs 4/13/2023:Negative ASMA, normal CBC, glucose 104, normal total bilirubin, normal alk phos, AST 40, ALT 36, ferritin 277, normal GGT, negative hep B surface antigen, negative hep C antibody, normal iron panel.  We do not have ultrasound for review.  Labs 11/9/2023: Glucose 109, normal LFTs, total cholesterol 234.  She had back surgery in September. She has only 25% improvement in her pain. She was hoping to come off of her opioids however she is still reliant. She continues to have epigastric burning. PPI therapy is ineffective. Her LFTs has improved. She denies excessive ETOH use. SHe had a CT last month for her cancer surveilance. THis was with Dr. Roldan. We do not have this for review, Her bowels move well on Miralax daily.  Interval history, 63-year-old female presents for follow-up.  We would obtain recent CT imaging for review and if normal consider EGD.  She was try Carafate in the interim.  We obtained her CT scanning of the abdomen and pelvis performed in November 2023.  This revealed no evidence for disease recurrence or metastatic disease.  It did note moderate to severe hepatic steatosis, left pelvic cyst/seroma which had increased in size.  There were no gallstones or other abnormalities of the gallbladder.  She was informed that if her epigastric burning was still present bothersome we could proceed with EGD.  When contacted the patient states she is not having any issues and she would call us back if needed.  She was on Reglan for "awhile." She does have twitching of her hands that she has not noticed. Her nausea has worsened of late as well as her epigastric burning. Carafate was not effective. SHe does not recall trying FDgard. She is not able to come off of narcotics. Interval history, 9/25/2024. Gastric emptying study performed September 12 revealed normal gastric emptying. Laboratory testing from July of this year revealed normal creatinine.  Ca1 25 was normal.  Laboratory testing from June 24 of this year revealed elevated AST at 62 ALT 42 otherwise normal CMP, CBC showing normal hemoglobin of 14 slightly low end of normal platelet count of 174.  MCV was slightly high at 97. The patient states her main complaint currently is occasional regurgitation and heartburn.  She is not currently on a acid reflux medication at this time.  She rarely takes promethazine anymore.  Her bowel movements are good with MiraLAX.  Overall she states she is doing quite well. Interval history, 10/29/2024. Laboratory testing dated October 17 of this month revealed a normal CMP except for AST of 64 and ALT of 46.  Iron studies showed normal percent saturation at 37.  Anti-smooth muscle antibody was normal at 7.  Ferritin was slightly elevated at 376.  Antimitochondrial antibody was negative. Ultrasound with fibrosis score revealed median elastography of 10.1 consistent with F3 fibrosis. The patient states her main problem currently is severe bloating especially in the first half of the day with some nausea and abdominal discomfort.  She is having 1-3 bowel movements daily.  We reviewed her ultrasound results at length. Interval history, 2/25/2025. Upper endoscopy performed January 27 of last month for epigastric abdominal pain revealed mild inflammation in the gastric antrum.  Biopsies of the stomach were unremarkable. CT scan of the abdomen and pelvis with contrast performed November 13, 2024 revealed hepatic steatosis but otherwise unremarkable.  Postsurgical changes seen. The patient states amitriptyline has improved her symptomatology a great deal.  She still has some symptoms but markedly improved.

## 2025-03-27 ENCOUNTER — TELEPHONE ENCOUNTER (OUTPATIENT)
Dept: URBAN - METROPOLITAN AREA CLINIC 113 | Facility: CLINIC | Age: 65
End: 2025-03-27

## 2025-03-27 RX ORDER — RESMETIROM 100 MG/1
100 MG TABLET, COATED ORAL ONCE DAILY
Qty: 90 | Refills: 3 | OUTPATIENT
Start: 2025-03-28

## 2025-04-02 ENCOUNTER — ERX REFILL RESPONSE (OUTPATIENT)
Dept: URBAN - METROPOLITAN AREA CLINIC 113 | Facility: CLINIC | Age: 65
End: 2025-04-02

## 2025-04-02 RX ORDER — LINACLOTIDE 290 UG/1
TAKE ONE CAPSULE BY MOUTH ONCE DAILY CAPSULE, GELATIN COATED ORAL
Qty: 30 CAPSULE | Refills: 11 | OUTPATIENT

## 2025-04-03 ENCOUNTER — TELEPHONE ENCOUNTER (OUTPATIENT)
Dept: URBAN - METROPOLITAN AREA CLINIC 113 | Facility: CLINIC | Age: 65
End: 2025-04-03

## 2025-04-03 RX ORDER — RESMETIROM 100 MG/1
100 MG TABLET, COATED ORAL ONCE DAILY
Qty: 90 | Refills: 3
Start: 2025-03-28

## 2025-04-18 ENCOUNTER — TELEPHONE ENCOUNTER (OUTPATIENT)
Dept: URBAN - METROPOLITAN AREA CLINIC 113 | Facility: CLINIC | Age: 65
End: 2025-04-18

## 2025-06-25 ENCOUNTER — TELEPHONE ENCOUNTER (OUTPATIENT)
Dept: URBAN - METROPOLITAN AREA CLINIC 113 | Facility: CLINIC | Age: 65
End: 2025-06-25

## 2025-07-01 ENCOUNTER — ERX REFILL RESPONSE (OUTPATIENT)
Dept: URBAN - METROPOLITAN AREA CLINIC 113 | Facility: CLINIC | Age: 65
End: 2025-07-01

## 2025-07-01 RX ORDER — AMITRIPTYLINE HYDROCHLORIDE 25 MG/1
TAKE ONE TABLET BY MOUTH AT BEDTIME TABLET, FILM COATED ORAL
Qty: 30 TABLET | Refills: 11 | OUTPATIENT

## 2025-07-01 RX ORDER — AMITRIPTYLINE HYDROCHLORIDE 25 MG/1
1 TABLET AT BEDTIME TABLET, FILM COATED ORAL ONCE A DAY
Qty: 30 TABLET | Refills: 4 | OUTPATIENT

## 2025-07-09 ENCOUNTER — OFFICE VISIT (OUTPATIENT)
Dept: URBAN - METROPOLITAN AREA CLINIC 113 | Facility: CLINIC | Age: 65
End: 2025-07-09

## 2025-07-29 ENCOUNTER — OFFICE VISIT (OUTPATIENT)
Dept: URBAN - METROPOLITAN AREA CLINIC 113 | Facility: CLINIC | Age: 65
End: 2025-07-29
Payer: COMMERCIAL

## 2025-07-29 ENCOUNTER — LAB OUTSIDE AN ENCOUNTER (OUTPATIENT)
Dept: URBAN - METROPOLITAN AREA CLINIC 113 | Facility: CLINIC | Age: 65
End: 2025-07-29

## 2025-07-29 DIAGNOSIS — R10.84 GENERALIZED ABDOMINAL PAIN: ICD-10-CM

## 2025-07-29 DIAGNOSIS — K59.03 DRUG-INDUCED CONSTIPATION: ICD-10-CM

## 2025-07-29 DIAGNOSIS — D3A.012 BENIGN CARCINOID TUMOR OF ILEUM: ICD-10-CM

## 2025-07-29 DIAGNOSIS — K75.81 NASH (NONALCOHOLIC STEATOHEPATITIS): ICD-10-CM

## 2025-07-29 PROCEDURE — 99214 OFFICE O/P EST MOD 30 MIN: CPT | Performed by: NURSE PRACTITIONER

## 2025-07-29 RX ORDER — OXYCODONE 15 MG/1
TAKE 1 TABLET EVERY 6 HOURS AS NEEDED FOR PAIN TABLET ORAL
Refills: 0 | Status: ACTIVE | COMMUNITY

## 2025-07-29 RX ORDER — PRAVASTATIN SODIUM 20 MG/1
TAKE 1 TABLET DAILY AS DIRECTED TABLET ORAL
Refills: 0 | Status: ACTIVE | COMMUNITY
Start: 2019-07-02

## 2025-07-29 RX ORDER — RESMETIROM 100 MG/1
100 MG TABLET, COATED ORAL ONCE DAILY
Qty: 90 | Refills: 3 | Status: DISCONTINUED | COMMUNITY
Start: 2025-03-28

## 2025-07-29 RX ORDER — LINACLOTIDE 290 UG/1
TAKE ONE CAPSULE BY MOUTH ONCE DAILY CAPSULE, GELATIN COATED ORAL
Qty: 30 CAPSULE | Refills: 11 | Status: ACTIVE | COMMUNITY

## 2025-07-29 RX ORDER — ONDANSETRON HYDROCHLORIDE 4 MG/1
TAKE ONE TABLET BY MOUTH EVERY 6 HOURS AS NEEDED TABLET, FILM COATED ORAL
Qty: 30 TABLET | Refills: 1 | Status: ACTIVE | COMMUNITY

## 2025-07-29 RX ORDER — PANTOPRAZOLE SODIUM 40 MG/1
TAKE ONE TABLET BY MOUTH TWICE DAILY TABLET, DELAYED RELEASE ORAL
Qty: 180 TABLET | Refills: 2 | Status: ACTIVE | COMMUNITY

## 2025-07-29 RX ORDER — ONDANSETRON 4 MG/1
1 TABLET ON THE TONGUE AND ALLOW TO DISSOLVE TABLET, ORALLY DISINTEGRATING ORAL
Qty: 90 | Refills: 0 | Status: ON HOLD | COMMUNITY
Start: 2024-08-13

## 2025-07-29 RX ORDER — TIZANIDINE HYDROCHLORIDE 4 MG/1
1 TABLET AS NEEDED TABLET ORAL THREE TIMES A DAY
Status: ACTIVE | COMMUNITY

## 2025-07-29 RX ORDER — POLYETHYLENE GLYCOL 3350, SODIUM SULFATE, SODIUM CHLORIDE, POTASSIUM CHLORIDE, ASCORBIC ACID, SODIUM ASCORBATE 140-9-5.2G
AS DIRECTED KIT ORAL
Status: ON HOLD | COMMUNITY
Start: 2021-02-26

## 2025-07-29 RX ORDER — FENOFIBRATE 145 MG/1
1 TABLET TABLET, FILM COATED ORAL ONCE A DAY
Status: ACTIVE | COMMUNITY

## 2025-07-29 RX ORDER — SERTRALINE HYDROCHLORIDE 100 MG/1
1 TABLET TABLET, FILM COATED ORAL ONCE A DAY
Status: ACTIVE | COMMUNITY

## 2025-07-29 RX ORDER — CLONIDINE HYDROCHLORIDE 0.1 MG/1
TAKE 1 TABLET TWICE DAILY TABLET ORAL
Refills: 0 | Status: ON HOLD | COMMUNITY
Start: 2019-07-03

## 2025-07-29 RX ORDER — PHENAZOPYRIDINE HYDROCHLORIDE 100 MG/1
TAKE 1 CAPSULE TWICE DAILY TABLET, COATED ORAL
Refills: 0 | Status: ON HOLD | COMMUNITY

## 2025-07-29 RX ORDER — LANSOPRAZOLE 30 MG/1
1 TABLET TABLET, ORALLY DISINTEGRATING, DELAYED RELEASE ORAL ONCE A DAY
Qty: 30 | Refills: 3 | Status: ON HOLD | COMMUNITY

## 2025-07-29 RX ORDER — VALACYCLOVIR HYDROCHLORIDE 500 MG/1
1 TABLET TABLET, FILM COATED ORAL ONCE A DAY
Refills: 0 | Status: ACTIVE | COMMUNITY
Start: 2020-03-05

## 2025-07-29 RX ORDER — ONDANSETRON 4 MG/1
1 TABLET ON THE TONGUE AND ALLOW TO DISSOLVE TABLET, ORALLY DISINTEGRATING ORAL
Qty: 40 | Refills: 0 | Status: ACTIVE | COMMUNITY
Start: 2024-07-26

## 2025-07-29 RX ORDER — AMITRIPTYLINE HYDROCHLORIDE 25 MG/1
TAKE ONE TABLET BY MOUTH AT BEDTIME TABLET, FILM COATED ORAL
Qty: 30 TABLET | Refills: 11 | Status: ACTIVE | COMMUNITY

## 2025-07-29 RX ORDER — FAMOTIDINE 40 MG/1
1 TABLET AT BEDTIME TABLET, FILM COATED ORAL ONCE A DAY
Qty: 90 TABLET | Refills: 0 | Status: ACTIVE | COMMUNITY
Start: 2024-07-26

## 2025-07-29 RX ORDER — MORPHINE SULFATE 30 MG/1
TAKE 1 TABLET TWICE DAILY TABLET, FILM COATED, EXTENDED RELEASE ORAL
Refills: 0 | Status: ACTIVE | COMMUNITY
Start: 2019-03-19

## 2025-07-29 NOTE — HPI-TODAY'S VISIT:
64-year-old woman, known to Dr. Cedillo, followed for common bile duct stones, IPMN and necrotic bowel syndrome, presenting for months follow-up. She was seen in the office in February 2025 in follow-up, irritable bowel syndrome.  She was noted to be doing well using amitriptyline 25 mg at bedtime.  Opiate induced constipation was controlled with Linzess and MiraLAX.  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  Was not felt to be a good candidate for GLP-1 agonist, however arrested for was a potential event in the future.  She tells me that she continues with generalized abdominal burning, nausea. Burning is now located in her mid abdomen. In the past 3 to 4 weeks, she has been having burning in the lower abdomen, below the belly button, across the entire lower abdomen. No exacerbating factors identified. No alleviation factors identified. She can have improvement with sleep, though the burning can also wake her at nighttime. She has bowel movements daily, sometimes 2 to 3 times daily. She is taking Linzess 290 mcg daily and Miralax every day to twice daily. No blood per rectum or melena. She continues with chronic pain, on medications to include morhonie 30 mg  twice daily, oxycodone 15 mg , and amitriptyline 25 mg daily. Medications are not making any improvement in her pain or burning. There has not been any significant nausea lately, stating it only occurs on occasion. There is no vomiting. She remains on pantoprazole and famotidine. There is no dysphagia. Appetite is great. She is not losing any weight unintentionally. Weight is stable at 160.2 pounds.